# Patient Record
Sex: MALE | Race: WHITE | NOT HISPANIC OR LATINO | Employment: FULL TIME | URBAN - METROPOLITAN AREA
[De-identification: names, ages, dates, MRNs, and addresses within clinical notes are randomized per-mention and may not be internally consistent; named-entity substitution may affect disease eponyms.]

---

## 2019-02-18 ENCOUNTER — OFFICE VISIT (OUTPATIENT)
Dept: FAMILY MEDICINE | Facility: CLINIC | Age: 44
End: 2019-02-18
Payer: COMMERCIAL

## 2019-02-18 VITALS
SYSTOLIC BLOOD PRESSURE: 138 MMHG | OXYGEN SATURATION: 97 % | BODY MASS INDEX: 28.84 KG/M2 | HEIGHT: 71 IN | HEART RATE: 111 BPM | WEIGHT: 206 LBS | TEMPERATURE: 97.1 F | DIASTOLIC BLOOD PRESSURE: 94 MMHG

## 2019-02-18 VITALS — DIASTOLIC BLOOD PRESSURE: 98 MMHG | SYSTOLIC BLOOD PRESSURE: 144 MMHG | HEART RATE: 86 BPM | OXYGEN SATURATION: 97 %

## 2019-02-18 DIAGNOSIS — L71.8 ACNE ROSACEA, PAPULAR TYPE: Primary | ICD-10-CM

## 2019-02-18 DIAGNOSIS — L30.9 ECZEMA, UNSPECIFIED TYPE: ICD-10-CM

## 2019-02-18 DIAGNOSIS — L53.9 FACIAL ERYTHEMA: ICD-10-CM

## 2019-02-18 DIAGNOSIS — I10 BENIGN ESSENTIAL HYPERTENSION: Primary | ICD-10-CM

## 2019-02-18 DIAGNOSIS — I78.1 FACIAL TELANGIECTASIA: ICD-10-CM

## 2019-02-18 LAB
ANION GAP SERPL CALCULATED.3IONS-SCNC: 7 MMOL/L (ref 3–14)
BUN SERPL-MCNC: 12 MG/DL (ref 7–30)
CALCIUM SERPL-MCNC: 9 MG/DL (ref 8.5–10.1)
CHLORIDE SERPL-SCNC: 101 MMOL/L (ref 94–109)
CO2 SERPL-SCNC: 29 MMOL/L (ref 20–32)
CREAT SERPL-MCNC: 0.75 MG/DL (ref 0.66–1.25)
GFR SERPL CREATININE-BSD FRML MDRD: >90 ML/MIN/{1.73_M2}
GLUCOSE SERPL-MCNC: 131 MG/DL (ref 70–99)
POTASSIUM SERPL-SCNC: 3.5 MMOL/L (ref 3.4–5.3)
SODIUM SERPL-SCNC: 137 MMOL/L (ref 133–144)

## 2019-02-18 PROCEDURE — 36415 COLL VENOUS BLD VENIPUNCTURE: CPT | Performed by: PHYSICIAN ASSISTANT

## 2019-02-18 PROCEDURE — 99203 OFFICE O/P NEW LOW 30 MIN: CPT | Performed by: PHYSICIAN ASSISTANT

## 2019-02-18 PROCEDURE — 99214 OFFICE O/P EST MOD 30 MIN: CPT | Performed by: FAMILY MEDICINE

## 2019-02-18 PROCEDURE — 80048 BASIC METABOLIC PNL TOTAL CA: CPT | Performed by: PHYSICIAN ASSISTANT

## 2019-02-18 RX ORDER — AZELAIC ACID 0.15 G/G
GEL TOPICAL
Qty: 50 G | Refills: 3 | Status: SHIPPED | OUTPATIENT
Start: 2019-02-18 | End: 2020-03-10

## 2019-02-18 RX ORDER — TRIAMCINOLONE ACETONIDE 1 MG/G
CREAM TOPICAL
Qty: 80 G | Refills: 0 | Status: SHIPPED | OUTPATIENT
Start: 2019-02-18 | End: 2019-07-23

## 2019-02-18 RX ORDER — METRONIDAZOLE 7.5 MG/G
GEL TOPICAL
COMMUNITY
Start: 2018-08-22 | End: 2019-02-18 | Stop reason: ALTCHOICE

## 2019-02-18 RX ORDER — MULTIPLE VITAMINS W/ MINERALS TAB 9MG-400MCG
1 TAB ORAL DAILY
COMMUNITY

## 2019-02-18 RX ORDER — HYDROCHLOROTHIAZIDE 25 MG/1
TABLET ORAL
Qty: 90 TABLET | Refills: 1 | Status: SHIPPED | OUTPATIENT
Start: 2019-02-18 | End: 2019-03-22 | Stop reason: ALTCHOICE

## 2019-02-18 RX ORDER — AMLODIPINE BESYLATE 5 MG/1
TABLET ORAL
Qty: 90 TABLET | Refills: 1 | Status: SHIPPED | OUTPATIENT
Start: 2019-02-18 | End: 2019-03-22 | Stop reason: ALTCHOICE

## 2019-02-18 RX ORDER — HYDROCHLOROTHIAZIDE 25 MG/1
TABLET ORAL
COMMUNITY
Start: 2018-04-11 | End: 2019-02-18

## 2019-02-18 RX ORDER — DOXYCYCLINE 100 MG/1
100 CAPSULE ORAL 2 TIMES DAILY
Qty: 120 CAPSULE | Refills: 0 | Status: SHIPPED | OUTPATIENT
Start: 2019-02-18 | End: 2019-04-22

## 2019-02-18 RX ORDER — AMLODIPINE BESYLATE 5 MG/1
TABLET ORAL
COMMUNITY
Start: 2018-03-15 | End: 2019-02-18

## 2019-02-18 RX ORDER — TRIAMCINOLONE ACETONIDE 1 MG/G
OINTMENT TOPICAL
COMMUNITY
Start: 2018-02-14 | End: 2019-02-18 | Stop reason: ALTCHOICE

## 2019-02-18 RX ORDER — AMPICILLIN TRIHYDRATE 500 MG
CAPSULE ORAL
COMMUNITY
Start: 2012-12-06 | End: 2020-12-03

## 2019-02-18 ASSESSMENT — MIFFLIN-ST. JEOR: SCORE: 1841.54

## 2019-02-18 NOTE — LETTER
February 19, 2019      Bacilio Stackr  [address]  [place] [address]        Dear ,    We are writing to inform you of your test results.      -Kidney function is normal (Cr, GFR), Sodium is normal, Potassium is normal, Calcium is normal, You were not fasting for this sample and so your blood sugar looks high, but this is a normal blood sugar after eating      Resulted Orders   Basic metabolic panel   Result Value Ref Range    Sodium 137 133 - 144 mmol/L    Potassium 3.5 3.4 - 5.3 mmol/L    Chloride 101 94 - 109 mmol/L    Carbon Dioxide 29 20 - 32 mmol/L    Anion Gap 7 3 - 14 mmol/L    Glucose 131 (H) 70 - 99 mg/dL      Comment:      Non Fasting    Urea Nitrogen 12 7 - 30 mg/dL    Creatinine 0.75 0.66 - 1.25 mg/dL    GFR Estimate >90 >60 mL/min/[1.73_m2]      Comment:      Non  GFR Calc  Starting 12/18/2018, serum creatinine based estimated GFR (eGFR) will be   calculated using the Chronic Kidney Disease Epidemiology Collaboration   (CKD-EPI) equation.      GFR Estimate If Black >90 >60 mL/min/[1.73_m2]      Comment:       GFR Calc  Starting 12/18/2018, serum creatinine based estimated GFR (eGFR) will be   calculated using the Chronic Kidney Disease Epidemiology Collaboration   (CKD-EPI) equation.      Calcium 9.0 8.5 - 10.1 mg/dL       If you have any questions or concerns, please call the clinic at the number listed above.       Sincerely,          Ari Danielson PA-C

## 2019-02-18 NOTE — LETTER
"    2/18/2019         RE: Bacilio Wolf  [address]  [place] [address]        Dear Colleague,    Thank you for referring your patient, Bacilio Wolf, to the [facility] [place]. Please see a copy of my visit note below.    [facility] - PRIMARY CARE SKIN    CC: facial redness  SUBJECTIVE:   Bacilio Wolf is a(n) 43 year old male who presents to clinic today because of suspected rosacea that started 2 years ago, although symptoms may have been chronic since younger.    This was diagnosed as ?seborrheic dermatitis treated with an oral medication to be taken when needed. Symptoms may have resolved while taking the oral medication. Another clinician then described it as rosacea and treated with metronidazole 0.75% gel, but it appeared to be effective when used for 6-8 months.    Facial symptoms include: Facial redness, welts on the face, in the ears, and behind the ears, crusting areas on the ears and face that burst spontaneously. Facial redness waxes and wanes.  Eye irritation: YES - \"a little bit, sometimes\"    He also reports a chronic rash on the right lateral lower leg beginning 2 years ago as well. He has used triamcinolone 0.1% cream. Symptoms on the legs resolve when triamcinolone 0.1% cream is used for 2-3 days at a time, resolving for 1 week.    He has a cyst with white purulent drainage.    Aggravating factors :     Sun: ?YES.    Alcohol: YES.    Specific foods: breaded/fried foods, spicy foods.  Discontinuation of these products has not provided relief.  Relieving factors : none identified    Products used: He has been using Vanicream facial moisturizer.    Personal Medical History  Skin cancer: NO  Eczema Psoriasis Autoimmune   NO ?YES NO   Other: ?rosacea    Family Medical History  Rosacea: NO.  Skin cancer: NO  Eczema Psoriasis Autoimmune   NO NO NO     Occupation: office, [employer] (indoor).    Social History     Tobacco Use     Smoking status: Never Smoker     " Smokeless tobacco: Never Used   Substance Use Topics     Alcohol use: Yes     Drug use: No     Issue Two: He has not been taking antihypertensive medications.    Refer to electronic medical record (EMR) for past medical history and medications.    INTEGUMENTARY/SKIN: POSITIVE for non-healing lesions and rash  ROS: 14 point review of systems was negative except the symptoms listed above in the HPI.    This document serves as a record of the services and decisions personally performed and made by Kathleen Cardona MD and was created by Jered Vera, a trained medical scribe, based on personal observations and provider statements to the medical scribe.  February 18, 2019 8:36 AM   Jered Vera    OBJECTIVE:   GENERAL: healthy, alert and in mild distress.  SKIN: Grace Skin Type - I.  Face, Neck, Trunk, Arms and Legs examined. The dermatoscope was used to help evaluate pigmented lesions.  Skin Pertinent Findings:  Right lateral lower leg: erythematous lightly scaly patch    Ears, arms, trunk: Clear    Face: diffuse erythema, multiple scattered papules. A few pustules. Scattered telangiectasias on the cheek. Some light scaling on the cheeks    A few papules in the postauricular areas.            Diagnostic Test Results:  none     ASSESSMENT:     Encounter Diagnoses   Name Primary?     Acne rosacea, papular type Yes     Facial erythema      Facial telangiectasia      Eczema, unspecified type      MDM:   Risk, benefits and alternative treatments were discussed with the patient. Cleansing program was discussed with the patient. Gentle cleansers were recommended. I discussed the importance of treating the skin gently and discussed triggers that could cause a flare. I discussed that this is usually a chronic condition that can be controlled, but not cured.    PLAN:   Patient Instructions   FUTURE APPOINTMENTS  Follow up in 2 month(s).    TOPICAL MEDICATION INSTRUCTIONS  Azelaic acid (Finacea) 15% gel.    Apply a layer to  the affected area(s) on face two times per day, everyday regardless if symptoms are present or not.    Keep in mind to also regularly use moisturizer, as this preventative measure can help maintain your skin's natural moisture barrier.    Apply moisturizer after application of medication.    ORAL ANTIBIOTIC  Take by mouth doxycycline 100 mg two time(s) a day for 2 months.    TETRACYCLINE ANTIBIOTIC INFORMATION  Minocycline and doxycycline are tetracycline antibiotics and can increase your sun sensitivity, so make sure to be vigilant in regularly applying sunscreen. Also, avoid taking these with dairy products, as calcium can bind the antibiotic, making it unavailable to your body.    Avoid excessive alcohol, spicy food consumption or sun exposure.      TOPICAL STEROID INSTRUCTIONS - for legs  Triamcinolone 0.1% cream.  Apply two times per day for 10-14 days. Then, use only when needed.  1. Wash hands before applying topical steroid.  2. Apply sparingly (just enough to rub in) onto affected areas of the legs.  (Use the adult fingertip unit (FTU) as a guide.) Apply no more than 0.5 FTU per area.      This higher strength steroid should never be used on face nor groin.    After the initial treatment, topical steroid may be used as needed for flare-ups but only for short-term treatment. If you are using this for prolonged periods of time to control flare-ups, return to clinic for re-evaluation of treatment.    Keep in mind to also regularly use moisturizer, as this preventative measure can help maintain your skin's natural protective moisture barrier.    DRY SKIN MANAGEMENT INSTRUCTIONS  Routine use of moisturizer is important for healthy, resilient skin not just for soft skin.     Sealing in moisture    Twice daily use of a moisturizer such as over-the-counter (OTC) CeraVe moisturizer cream (in the jar). CeraVe products contain ceramides and filaggrin proteins that can help to maintain the body's moisture  "layer.    Also use your Vanicream facial moisturizer regularly.    After cleansing or washing, always apply moisturizer immediately after drying off (pat dry only) for best effect.    Protection while hydrating    Do not overuse soap. Unless you have been sweating extensively, just apply soap to groin and armpits.    Recommended products for body include: OTC unscented Dove for sensitive skin or OTC Vanicream cleansing bar.    Recommended facial cleansers include: OTC CeraVe hydrating facial cleanser or OTC Cetaphil daily facial cleanser.    Avoid use of    Scented/perfumed products    Irritating clothing (wool, new jeans, new/unwashed clothing, scratchy synthetics)    Neosporin or triple antibiotic topical products    Products containing aloe, herbs, Vitamin E, or other \"natural ingredients\".    Dryer sheets or fabric softeners (while symptoms are present)    If a topical medication is prescribed, apply topical prescription first, followed by use of moisturizing product.      BP Readings from Last 3 Encounters:   02/18/19 (!) 144/98     Bacilio Wolf was evaluated in a specialty clinic today at which time his blood pressure was noted to be elevated. He has been advised to follow up with his primary provider or with a nurse only visit. We are also routing this message to his primary provider as an FYI.    [place] to follow up with Primary Care provider regarding elevated blood pressure.      TT: 25 minutes.  CT: 20 minutes.    The information in this document, created by the medical scribe for me, accurately reflects the services I personally performed and the decisions made by me. I have reviewed and approved this document for accuracy prior to leaving the patient care area.  February 18, 2019 8:36 AM  Kathleen Cardona MD  [facility] [place]      Again, thank you for allowing me to participate in the care of your patient.        Sincerely,        Kathleen Cardona MD    "

## 2019-02-18 NOTE — PROGRESS NOTES
SUBJECTIVE:   Bacilio Wolf is a 43 year old male who presents to clinic today for the following health issues:      Hypertension Follow-up      Outpatient blood pressures are not being checked.    Low Salt Diet: not monitoring salt      Amount of exercise or physical activity: None    Problems taking medications regularly: No    Medication side effects: none    Diet: diet specific to his rosasea     Bacilio recently switched health insurance and presents to the clinic today for BP check and to establish care. He has been out of his hydrochlorothiazide 25 mg and amlodipine 5 mg for the past 3 months, has not been checking his BP at home.         Problem list and histories reviewed & adjusted, as indicated.  Additional history: as documented    There is no problem list on file for this patient.    No past surgical history on file.    Social History     Tobacco Use     Smoking status: Never Smoker     Smokeless tobacco: Never Used   Substance Use Topics     Alcohol use: Yes     No family history on file.      Current Outpatient Medications   Medication Sig Dispense Refill     amLODIPine (NORVASC) 5 MG tablet TAKE 1 TABLET BY MOUTH DAILY (EVERY 24 HOURS). 90 tablet 1     azelaic acid (FINACIA) 15 % external gel APPLY TO FACE BID 50 g 3     hydrochlorothiazide (HYDRODIURIL) 25 MG tablet TAKE 1 TAB BY MOUTH DAILY. 90 tablet 1     metroNIDAZOLE (METROCREAM) 0.75 % external cream Apply topically 2 times daily       multivitamin w/minerals (MULTI-VITAMIN) tablet Take 1 tablet by mouth daily       triamcinolone (KENALOG) 0.1 % external cream Apply to AA on legs bid when needed 80 g 0     Cholecalciferol (D 1000) 1000 units CAPS        doxycycline monohydrate (MONODOX) 100 MG capsule Take 1 capsule (100 mg) by mouth 2 times daily (Patient not taking: Reported on 2/18/2019) 120 capsule 0     Allergies   Allergen Reactions     Diphenhydramine Rash     Rash , confusion       Reviewed and updated as needed this visit by  "clinical staff       Reviewed and updated as needed this visit by Provider         ROS:  Constitutional, HEENT, cardiovascular, pulmonary, gi and gu systems are negative, except as otherwise noted.    OBJECTIVE:     BP (!) 138/94   Pulse 111   Temp 97.1  F (36.2  C) (Tympanic)   Ht 1.803 m (5' 11\")   Wt 93.4 kg (206 lb)   SpO2 97%   BMI 28.73 kg/m    Body mass index is 28.73 kg/m .  GENERAL: healthy, alert and no distress  RESP: lungs clear to auscultation - no rales, rhonchi or wheezes  CV: regular rate and rhythm, normal S1 S2    Diagnostic Test Results:  none     ASSESSMENT/PLAN:       1. Benign essential hypertension  Elevated today, will restart old regimen.  He will RTC in 2 weeks for recheck, 6 months for CPE  - Basic metabolic panel  - hydrochlorothiazide (HYDRODIURIL) 25 MG tablet; TAKE 1 TAB BY MOUTH DAILY.  Dispense: 90 tablet; Refill: 1  - amLODIPine (NORVASC) 5 MG tablet; TAKE 1 TABLET BY MOUTH DAILY (EVERY 24 HOURS).  Dispense: 90 tablet; Refill: 1    Follow-Up: As above    Ari Danielson PA-C  [facility] [place]  "

## 2019-02-18 NOTE — PROGRESS NOTES
"[facility] - PRIMARY CARE SKIN    CC: facial redness  SUBJECTIVE:   Bacilio Wolf is a(n) 43 year old male who presents to clinic today because of suspected rosacea that started 2 years ago, although symptoms may have been chronic since younger.    This was diagnosed as ?seborrheic dermatitis treated with an oral medication to be taken when needed. Symptoms may have resolved while taking the oral medication. Another clinician then described it as rosacea and treated with metronidazole 0.75% gel, but it appeared to be effective when used for 6-8 months.    Facial symptoms include: Facial redness, welts on the face, in the ears, and behind the ears, crusting areas on the ears and face that burst spontaneously. Facial redness waxes and wanes.  Eye irritation: YES - \"a little bit, sometimes\"    He also reports a chronic rash on the right lateral lower leg beginning 2 years ago as well. He has used triamcinolone 0.1% cream. Symptoms on the legs resolve when triamcinolone 0.1% cream is used for 2-3 days at a time, resolving for 1 week.    He has a cyst with white purulent drainage.    Aggravating factors :     Sun: ?YES.    Alcohol: YES.    Specific foods: breaded/fried foods, spicy foods.  Discontinuation of these products has not provided relief.  Relieving factors : none identified    Products used: He has been using Vanicream facial moisturizer.    Personal Medical History  Skin cancer: NO  Eczema Psoriasis Autoimmune   NO ?YES NO   Other: ?rosacea    Family Medical History  Rosacea: NO.  Skin cancer: NO  Eczema Psoriasis Autoimmune   NO NO NO     Occupation: office, [employer] (indoor).    Social History     Tobacco Use     Smoking status: Never Smoker     Smokeless tobacco: Never Used   Substance Use Topics     Alcohol use: Yes     Drug use: No     Issue Two: He has not been taking antihypertensive medications.    Refer to electronic medical record (EMR) for past medical history and " medications.    INTEGUMENTARY/SKIN: POSITIVE for non-healing lesions and rash  ROS: 14 point review of systems was negative except the symptoms listed above in the HPI.    This document serves as a record of the services and decisions personally performed and made by Kathleen Cardona MD and was created by Jered Vera, a trained medical scribe, based on personal observations and provider statements to the medical scribe.  February 18, 2019 8:36 AM   Jered Vera    OBJECTIVE:   GENERAL: healthy, alert and in mild distress.  SKIN: Grace Skin Type - I.  Face, Neck, Trunk, Arms and Legs examined. The dermatoscope was used to help evaluate pigmented lesions.  Skin Pertinent Findings:  Right lateral lower leg: erythematous lightly scaly patch    Ears, arms, trunk: Clear    Face: diffuse erythema, multiple scattered papules. A few pustules. Scattered telangiectasias on the cheek. Some light scaling on the cheeks    A few papules in the postauricular areas.            Diagnostic Test Results:  none     ASSESSMENT:     Encounter Diagnoses   Name Primary?     Acne rosacea, papular type Yes     Facial erythema      Facial telangiectasia      Eczema, unspecified type      MDM:   Risk, benefits and alternative treatments were discussed with the patient. Cleansing program was discussed with the patient. Gentle cleansers were recommended. I discussed the importance of treating the skin gently and discussed triggers that could cause a flare. I discussed that this is usually a chronic condition that can be controlled, but not cured.    PLAN:   Patient Instructions   FUTURE APPOINTMENTS  Follow up in 2 month(s).    TOPICAL MEDICATION INSTRUCTIONS  Azelaic acid (Finacea) 15% gel.    Apply a layer to the affected area(s) on face two times per day, everyday regardless if symptoms are present or not.    Keep in mind to also regularly use moisturizer, as this preventative measure can help maintain your skin's natural moisture  barrier.    Apply moisturizer after application of medication.    ORAL ANTIBIOTIC  Take by mouth doxycycline 100 mg two time(s) a day for 2 months.    TETRACYCLINE ANTIBIOTIC INFORMATION  Minocycline and doxycycline are tetracycline antibiotics and can increase your sun sensitivity, so make sure to be vigilant in regularly applying sunscreen. Also, avoid taking these with dairy products, as calcium can bind the antibiotic, making it unavailable to your body.    Avoid excessive alcohol, spicy food consumption or sun exposure.      TOPICAL STEROID INSTRUCTIONS - for legs  Triamcinolone 0.1% cream.  Apply two times per day for 10-14 days. Then, use only when needed.  1. Wash hands before applying topical steroid.  2. Apply sparingly (just enough to rub in) onto affected areas of the legs.  (Use the adult fingertip unit (FTU) as a guide.) Apply no more than 0.5 FTU per area.      This higher strength steroid should never be used on face nor groin.    After the initial treatment, topical steroid may be used as needed for flare-ups but only for short-term treatment. If you are using this for prolonged periods of time to control flare-ups, return to clinic for re-evaluation of treatment.    Keep in mind to also regularly use moisturizer, as this preventative measure can help maintain your skin's natural protective moisture barrier.    DRY SKIN MANAGEMENT INSTRUCTIONS  Routine use of moisturizer is important for healthy, resilient skin not just for soft skin.     Sealing in moisture    Twice daily use of a moisturizer such as over-the-counter (OTC) CeraVe moisturizer cream (in the jar). CeraVe products contain ceramides and filaggrin proteins that can help to maintain the body's moisture layer.    Also use your Vanicream facial moisturizer regularly.    After cleansing or washing, always apply moisturizer immediately after drying off (pat dry only) for best effect.    Protection while hydrating    Do not overuse soap.  "Unless you have been sweating extensively, just apply soap to groin and armpits.    Recommended products for body include: OTC unscented Dove for sensitive skin or OTC Vanicream cleansing bar.    Recommended facial cleansers include: OTC CeraVe hydrating facial cleanser or OTC Cetaphil daily facial cleanser.    Avoid use of    Scented/perfumed products    Irritating clothing (wool, new jeans, new/unwashed clothing, scratchy synthetics)    Neosporin or triple antibiotic topical products    Products containing aloe, herbs, Vitamin E, or other \"natural ingredients\".    Dryer sheets or fabric softeners (while symptoms are present)    If a topical medication is prescribed, apply topical prescription first, followed by use of moisturizing product.      BP Readings from Last 3 Encounters:   02/18/19 (!) 144/98     Bacilio Wolf was evaluated in a specialty clinic today at which time his blood pressure was noted to be elevated. He has been advised to follow up with his primary provider or with a nurse only visit. We are also routing this message to his primary provider as an FYI.    [place] to follow up with Primary Care provider regarding elevated blood pressure.      TT: 25 minutes.  CT: 20 minutes.    The information in this document, created by the medical scribe for me, accurately reflects the services I personally performed and the decisions made by me. I have reviewed and approved this document for accuracy prior to leaving the patient care area.  February 18, 2019 8:36 AM  Kathleen Cardona MD  [facility] [place]    "

## 2019-03-04 ENCOUNTER — ALLIED HEALTH/NURSE VISIT (OUTPATIENT)
Dept: NURSING | Facility: CLINIC | Age: 44
End: 2019-03-04
Payer: COMMERCIAL

## 2019-03-04 VITALS — SYSTOLIC BLOOD PRESSURE: 136 MMHG | DIASTOLIC BLOOD PRESSURE: 98 MMHG

## 2019-03-04 DIAGNOSIS — Z01.30 BLOOD PRESSURE CHECK: Primary | ICD-10-CM

## 2019-03-04 PROCEDURE — 99207 ZZC NO CHARGE NURSE ONLY: CPT

## 2019-03-04 NOTE — PROGRESS NOTES
Bacilio Wolf is a 43 year old year old patient who comes in today for a Blood Pressure check because of new medication and ongoing blood pressure monitoring.  Vital Signs as repeated by RN  Patient is taking medication as prescribed  Patient is tolerating medications well.  Patient is not monitoring Blood Pressure at home.    Current complaints: cough  Disposition:  huddled with provider and patient instructed to give us a call if he has any chest pain

## 2019-03-22 ENCOUNTER — OFFICE VISIT (OUTPATIENT)
Dept: FAMILY MEDICINE | Facility: CLINIC | Age: 44
End: 2019-03-22
Payer: COMMERCIAL

## 2019-03-22 VITALS
DIASTOLIC BLOOD PRESSURE: 89 MMHG | BODY MASS INDEX: 29.99 KG/M2 | HEIGHT: 71 IN | WEIGHT: 214.2 LBS | HEART RATE: 97 BPM | SYSTOLIC BLOOD PRESSURE: 136 MMHG | OXYGEN SATURATION: 99 % | TEMPERATURE: 98.2 F

## 2019-03-22 DIAGNOSIS — I87.2 STASIS DERMATITIS OF BOTH LEGS: Primary | ICD-10-CM

## 2019-03-22 DIAGNOSIS — I10 BENIGN ESSENTIAL HYPERTENSION: ICD-10-CM

## 2019-03-22 DIAGNOSIS — R60.0 BILATERAL LOWER EXTREMITY EDEMA: ICD-10-CM

## 2019-03-22 LAB
ALBUMIN SERPL-MCNC: 4.1 G/DL (ref 3.4–5)
ALP SERPL-CCNC: 94 U/L (ref 40–150)
ALT SERPL W P-5'-P-CCNC: 52 U/L (ref 0–70)
ANION GAP SERPL CALCULATED.3IONS-SCNC: 9 MMOL/L (ref 3–14)
AST SERPL W P-5'-P-CCNC: 43 U/L (ref 0–45)
BILIRUB SERPL-MCNC: 0.3 MG/DL (ref 0.2–1.3)
BUN SERPL-MCNC: 14 MG/DL (ref 7–30)
CALCIUM SERPL-MCNC: 8.7 MG/DL (ref 8.5–10.1)
CHLORIDE SERPL-SCNC: 103 MMOL/L (ref 94–109)
CO2 SERPL-SCNC: 27 MMOL/L (ref 20–32)
CREAT SERPL-MCNC: 0.68 MG/DL (ref 0.66–1.25)
GFR SERPL CREATININE-BSD FRML MDRD: >90 ML/MIN/{1.73_M2}
GLUCOSE SERPL-MCNC: 88 MG/DL (ref 70–99)
POTASSIUM SERPL-SCNC: 3.6 MMOL/L (ref 3.4–5.3)
PROT SERPL-MCNC: 7.5 G/DL (ref 6.8–8.8)
SODIUM SERPL-SCNC: 139 MMOL/L (ref 133–144)

## 2019-03-22 PROCEDURE — 99214 OFFICE O/P EST MOD 30 MIN: CPT | Performed by: NURSE PRACTITIONER

## 2019-03-22 PROCEDURE — 80053 COMPREHEN METABOLIC PANEL: CPT | Performed by: NURSE PRACTITIONER

## 2019-03-22 PROCEDURE — 36415 COLL VENOUS BLD VENIPUNCTURE: CPT | Performed by: NURSE PRACTITIONER

## 2019-03-22 RX ORDER — LISINOPRIL AND HYDROCHLOROTHIAZIDE 20; 25 MG/1; MG/1
1 TABLET ORAL DAILY
Qty: 90 TABLET | Refills: 0 | Status: SHIPPED | OUTPATIENT
Start: 2019-03-22 | End: 2019-07-02

## 2019-03-22 ASSESSMENT — MIFFLIN-ST. JEOR: SCORE: 1878.73

## 2019-03-22 NOTE — PROGRESS NOTES
SUBJECTIVE:   Bacilio Wolf is a 43 year old male who presents to clinic today for the following health issues:      Joint Pain    Onset: 2 weeks     Description:   Location: right ankle  Character: Dull ache mostly swelling     Intensity: moderate    Progression of Symptoms: intermittent    Accompanying Signs & Symptoms:  Other symptoms: none    History:   Previous similar pain: no       Precipitating factors:   Trauma or overuse: YES- pt moved 2 weeks ago     Alleviating factors:  Improved by: rest/inactivity    Therapies Tried and outcome: none    HPI: Bacilio presents today with the complaint of progressively-worsening bilateral (R > L) lower extremity swelling, redness, and pain. He states that this issue started about 2 weeks ago and has gotten worse. Denies history of heart disease, as well as any classic heart failure symptoms. Also denies history of renal or hepatic disease. He does endorse daily alcohol consumption (at least a few drinks per day), as well as high-dose naproxen therapy (up to 6 tabs per day). He denies recalled injury or trauma to the affected areas. He states that the swelling worsens throughout the day and is relieved with elevation of his legs. He has hypertension, for which he takes amlodipine and hydrochlorothiazide daily. Denies paroxysmal nocturnal dyspnea, nocturia, new cough, shortness of breath, activity intolerance. Denies changes in urinary or bowel patterns, as well as abdominal pain. Denies calf tenderness, chest pain, shortness of breath, history of blood clots, recent long drives / flights, fever, chills, change in weight. He denies prior history of lower extremity swelling. Non-smoker.       Problem list and histories reviewed & adjusted, as indicated.  Additional history: as documented    There is no problem list on file for this patient.    History reviewed. No pertinent surgical history.    Social History     Tobacco Use     Smoking status: Never Smoker     Smokeless  "tobacco: Never Used   Substance Use Topics     Alcohol use: Yes     History reviewed. No pertinent family history.        Reviewed and updated as needed this visit by clinical staff  Tobacco  Allergies  Meds  Problems  Med Hx  Surg Hx  Fam Hx  Soc Hx        Reviewed and updated as needed this visit by Provider  Tobacco  Allergies  Meds  Problems  Med Hx  Surg Hx  Fam Hx         ROS:  Constitutional, cardiovascular, pulmonary, GI, , musculoskeletal, neuro, skin systems are negative, except as otherwise noted.    OBJECTIVE:     /89   Pulse 97   Temp 98.2  F (36.8  C) (Tympanic)   Ht 1.803 m (5' 11\")   Wt 97.2 kg (214 lb 3.2 oz)   SpO2 99%   BMI 29.87 kg/m    Body mass index is 29.87 kg/m .  GENERAL: healthy, alert and no distress  EYES: Eyes grossly normal to inspection, PERRL and conjunctivae and sclerae normal  HENT: ear canals and TM's normal, nose and mouth without ulcers or lesions  NECK: no adenopathy, no asymmetry, masses, or scars and thyroid normal to palpation  RESP: lungs clear to auscultation - no rales, rhonchi or wheezes  CV: regular rate and rhythm, normal S1 S2, no S3 or S4, no murmur, click or rub, no peripheral edema and peripheral pulses strong  ABDOMEN: soft, nontender, no hepatosplenomegaly, no masses and bowel sounds normal  MS: Bilateral lower extremity pitting edema (R 3+ > L +2) with overlying tautness and redness. Mildly tender. ROM intact in all involved joints without pain or limitation.   SKIN: Likely stasis dermatitis (erythematous, swelling) starting at mid-tibia. No bleeding, discharge, or skin integrity compromise.  NEURO: Normal strength and tone, mentation intact and speech normal    Diagnostic Test Results:  No results found for this or any previous visit (from the past 24 hour(s)).    ASSESSMENT/PLAN:     Bacilio was seen today for musculoskeletal problem. Unlikely that this represents DVT (bilateral involvement, improvement with elevation, few risk " factors). Not clear what the origin of his edema is. Denies HF, kidney disease, and hepatic disease symptoms, although he does have HTN, uses high doses of NSAIDs daily, and drinks alcohol daily. I think that checking CMP would be wise at this point to look for low albumin, elevated LFTs, electrolyte imbalance, and renal injury. Will also switch his anti-hypertensive to lisinopril plus hydrochlorothiazide given that lower extremity edema is a known side effect of amlodipine (though strange that it would develop so suddenly). Will order compression stockings today, as well, and stress the importance of caring for his skin in the affected regions (to prevent stasis ulcers from developing). Also recommended elevation of legs when able. Will consider referral to vascular if no clear etiology is ascertained and/or if he doesn't note improvement with NSAID reduction and switch from CCB. Will be in touch with lab results. He agrees to keep me posted. Discussed reasons to call or return to clinic. Bacilio acknowledges and demonstrates understanding of circumstances under which care should be sought urgently or emergently. Follow up as discussed. Discussed risks, benefits, alternatives, potential side effects, and proper administration of new medication / treatment. Agrees with plan of care. All questions answered.     Diagnoses and all orders for this visit:    Stasis dermatitis of both legs  -     order for DME; Equipment being ordered: Compression stockings for venous stasis / LLE edema    Bilateral lower extremity edema  -     Comprehensive metabolic panel (BMP + Alb, Alk Phos, ALT, AST, Total. Bili, TP)  -     order for DME; Equipment being ordered: Compression stockings for venous stasis / LLE edema    Benign essential hypertension  -     lisinopril-hydrochlorothiazide (PRINZIDE/ZESTORETIC) 20-25 MG tablet; Take 1 tablet by mouth daily      See Patient Instructions    Danilo Reinoso, MALINDA  [facility]  [facility]

## 2019-03-22 NOTE — PATIENT INSTRUCTIONS
Stop amlodipine and hydrodiuril  Start lisinopril-hydrochlorothiazide  Get fitted for compression stockings  Elevate legs when able  Keep skin moisturized  I will call you with lab results

## 2019-04-22 ENCOUNTER — OFFICE VISIT (OUTPATIENT)
Dept: FAMILY MEDICINE | Facility: CLINIC | Age: 44
End: 2019-04-22
Payer: COMMERCIAL

## 2019-04-22 VITALS — DIASTOLIC BLOOD PRESSURE: 80 MMHG | SYSTOLIC BLOOD PRESSURE: 134 MMHG

## 2019-04-22 DIAGNOSIS — L71.9 ROSACEA: Primary | ICD-10-CM

## 2019-04-22 PROCEDURE — 99213 OFFICE O/P EST LOW 20 MIN: CPT | Performed by: FAMILY MEDICINE

## 2019-04-22 RX ORDER — DOXYCYCLINE 50 MG/1
TABLET ORAL
Qty: 240 TABLET | Refills: 0 | Status: SHIPPED | OUTPATIENT
Start: 2019-04-22 | End: 2019-09-17

## 2019-04-22 NOTE — PROGRESS NOTES
"[facility] - PRIMARY CARE SKIN    CC: rosacea  SUBJECTIVE:   Bacilio Wolf is a(n) 43 year old male who presents to clinic today for follow-up of rosacea that started 2 years ago, although symptoms may have been chronic since younger.    Current treatment:   azelaic acid 15% gel BID  Doxycycline 100 mg BID.  He is trying to apply sunscreen four times a day.  Response to treatment: Symptoms on the face are greatly improved.  Side effects of treatment noted: None.    Previous therapies: metronidazole 0.75% gel    Facial symptoms include: Facial redness, welts on the face, in the ears, and behind the ears, crusting areas on the ears and face that burst spontaneously. Facial redness waxes and wanes.  Eye irritation: YES - \"a little bit, sometimes\"    Aggravating factors :     Sun: ?YES.     Alcohol: YES.    Specific foods: breaded/fried foods, spicy foods.  Discontinuation of these products has not provided relief.  Relieving factors : none identified    Products used: He has been using Vanicream facial moisturizer.    Eczema  He is continuing to use triamcinolone 0.1% cream as needed for control of symptoms. He has had a reaction of swelling to blood pressure medications.    Personal Medical History  Skin cancer: NO  Eczema Psoriasis Autoimmune   NO ?YES NO   Other: ?rosacea    Family Medical History  Rosacea: NO.  Skin cancer: NO  Eczema Psoriasis Autoimmune   NO NO NO     Occupation: office, [employer] (indoor).    Social History     Tobacco Use     Smoking status: Never Smoker     Smokeless tobacco: Never Used   Substance Use Topics     Alcohol use: Yes     Drug use: No     Refer to electronic medical record (EMR) for past medical history and medications.    INTEGUMENTARY/SKIN: POSITIVE for non-healing lesions and rash  ROS: 14 point review of systems was negative except the symptoms listed above in the HPI.    This document serves as a record of the services and decisions personally performed and made by " Kathleen Carodna MD and was created by Jered Vera, a trained medical scribe, based on personal observations and provider statements to the medical scribe.  April 22, 2019 8:40 AM   Jered Vera    OBJECTIVE:   GENERAL: healthy, alert and in mild distress.  SKIN: Grace Skin Type - I.  Face examined. The dermatoscope was used to help evaluate pigmented lesions.  Skin Pertinent Findings:  Face: overall improved but still significant baseline erythema on the nose and mid-portion of cheeks. Telangiectasias. A few papules on the nose.    ASSESSMENT:     Encounter Diagnosis   Name Primary?     Rosacea Yes     MDM:   Risk, benefits and alternative treatments were discussed with the patient. Cleansing program was discussed with the patient. Gentle cleansers were recommended. I discussed the importance of treating the skin gently and discussed triggers that could cause a flare. I discussed that this is usually a chronic condition that can be controlled, but not cured.    PLAN:   Patient Instructions   FUTURE APPOINTMENTS  Follow up in 3 month(s).    TOPICAL MEDICATION INSTRUCTIONS  Azelaic acid 15% gel    Apply a layer to the affected area(s) on face two times per day, everyday regardless if symptoms are present or not.    Keep in mind to also regularly use moisturizer, as this preventative measure can help maintain your skin's natural moisture barrier.    Apply moisturizer after application of medication.    ORAL ANTIBIOTIC  Take by mouth doxycycline 100 mg two time(s) a day for 1 more month  After 1 month, take by mouth doxycycline 50 mg twice a day.    TETRACYCLINE ANTIBIOTIC INFORMATION  Minocycline and doxycycline are tetracycline antibiotics and can increase your sun sensitivity, so make sure to be vigilant in regularly applying sunscreen. Also, avoid taking these with dairy products, as calcium can bind the antibiotic, making it unavailable to your body.    Avoid excessive alcohol, spicy food consumption or sun  exposure.      Eczema  Continue applying moisturizer regularly.  Continue using triamcinolone 0.1% cream as needed for control of itchiness and scaling on the arms, legs, and trunk. Never use on face nor groin.      TT: 20 minutes.  CT: 15 minutes.    The information in this document, created by the medical scribe for me, accurately reflects the services I personally performed and the decisions made by me. I have reviewed and approved this document for accuracy prior to leaving the patient care area.  April 22, 2019 8:39 AM  Kathleen Cardona MD  [facility]

## 2019-04-22 NOTE — PATIENT INSTRUCTIONS
FUTURE APPOINTMENTS  Follow up in 3 month(s).    TOPICAL MEDICATION INSTRUCTIONS  Azelaic acid 15% gel    Apply a layer to the affected area(s) on face two times per day, everyday regardless if symptoms are present or not.    Keep in mind to also regularly use moisturizer, as this preventative measure can help maintain your skin's natural moisture barrier.    Apply moisturizer after application of medication.    ORAL ANTIBIOTIC  Take by mouth doxycycline 100 mg two time(s) a day for 1 more month  After 1 month, take by mouth doxycycline 50 mg twice a day.    TETRACYCLINE ANTIBIOTIC INFORMATION  Minocycline and doxycycline are tetracycline antibiotics and can increase your sun sensitivity, so make sure to be vigilant in regularly applying sunscreen. Also, avoid taking these with dairy products, as calcium can bind the antibiotic, making it unavailable to your body.    Avoid excessive alcohol, spicy food consumption or sun exposure.      Eczema  Continue applying moisturizer regularly.  Continue using triamcinolone 0.1% cream as needed for control of itchiness and scaling on the arms, legs, and trunk. Never use on face nor groin.

## 2019-04-22 NOTE — LETTER
"    4/22/2019         RE: Bacilio Wolf  [address]  [place] [address]        Dear Colleague,    Thank you for referring your patient, Bacilio Wolf, to the [facility]. Please see a copy of my visit note below.    [facility] - PRIMARY CARE SKIN    CC: rosacea  SUBJECTIVE:   Bacilio Wolf is a(n) 43 year old male who presents to clinic today for follow-up of rosacea that started 2 years ago, although symptoms may have been chronic since younger.    Current treatment:   azelaic acid 15% gel BID  Doxycycline 100 mg BID.  He is trying to apply sunscreen four times a day.  Response to treatment: Symptoms on the face are greatly improved.  Side effects of treatment noted: None.    Previous therapies: metronidazole 0.75% gel    Facial symptoms include: Facial redness, welts on the face, in the ears, and behind the ears, crusting areas on the ears and face that burst spontaneously. Facial redness waxes and wanes.  Eye irritation: YES - \"a little bit, sometimes\"    Aggravating factors :     Sun: ?YES.     Alcohol: YES.    Specific foods: breaded/fried foods, spicy foods.  Discontinuation of these products has not provided relief.  Relieving factors : none identified    Products used: He has been using Vanicream facial moisturizer.    Eczema  He is continuing to use triamcinolone 0.1% cream as needed for control of symptoms. He has had a reaction of swelling to blood pressure medications.    Personal Medical History  Skin cancer: NO  Eczema Psoriasis Autoimmune   NO ?YES NO   Other: ?rosacea    Family Medical History  Rosacea: NO.  Skin cancer: NO  Eczema Psoriasis Autoimmune   NO NO NO     Occupation: office, [employer] (indoor).    Social History     Tobacco Use     Smoking status: Never Smoker     Smokeless tobacco: Never Used   Substance Use Topics     Alcohol use: Yes     Drug use: No     Refer to electronic medical record (EMR) for past medical history and " medications.    INTEGUMENTARY/SKIN: POSITIVE for non-healing lesions and rash  ROS: 14 point review of systems was negative except the symptoms listed above in the HPI.    This document serves as a record of the services and decisions personally performed and made by Kathleen Cardona MD and was created by Jered Vera, a trained medical scribe, based on personal observations and provider statements to the medical scribe.  April 22, 2019 8:40 AM   Jerde Vera    OBJECTIVE:   GENERAL: healthy, alert and in mild distress.  SKIN: Grace Skin Type - I.  Face examined. The dermatoscope was used to help evaluate pigmented lesions.  Skin Pertinent Findings:  Face: overall improved but still significant baseline erythema on the nose and mid-portion of cheeks. Telangiectasias. A few papules on the nose.    ASSESSMENT:     Encounter Diagnosis   Name Primary?     Rosacea Yes     MDM:   Risk, benefits and alternative treatments were discussed with the patient. Cleansing program was discussed with the patient. Gentle cleansers were recommended. I discussed the importance of treating the skin gently and discussed triggers that could cause a flare. I discussed that this is usually a chronic condition that can be controlled, but not cured.    PLAN:   Patient Instructions   FUTURE APPOINTMENTS  Follow up in 3 month(s).    TOPICAL MEDICATION INSTRUCTIONS  Azelaic acid 15% gel    Apply a layer to the affected area(s) on face two times per day, everyday regardless if symptoms are present or not.    Keep in mind to also regularly use moisturizer, as this preventative measure can help maintain your skin's natural moisture barrier.    Apply moisturizer after application of medication.    ORAL ANTIBIOTIC  Take by mouth doxycycline 100 mg two time(s) a day for 1 more month  After 1 month, take by mouth doxycycline 50 mg twice a day.    TETRACYCLINE ANTIBIOTIC INFORMATION  Minocycline and doxycycline are tetracycline antibiotics and can  increase your sun sensitivity, so make sure to be vigilant in regularly applying sunscreen. Also, avoid taking these with dairy products, as calcium can bind the antibiotic, making it unavailable to your body.    Avoid excessive alcohol, spicy food consumption or sun exposure.      Eczema  Continue applying moisturizer regularly.  Continue using triamcinolone 0.1% cream as needed for control of itchiness and scaling on the arms, legs, and trunk. Never use on face nor groin.      TT: 20 minutes.  CT: 15 minutes.    The information in this document, created by the medical scribe for me, accurately reflects the services I personally performed and the decisions made by me. I have reviewed and approved this document for accuracy prior to leaving the patient care area.  April 22, 2019 8:39 AM  Kathleen Cardona MD  [facility]    Again, thank you for allowing me to participate in the care of your patient.        Sincerely,        Kathleen Cardona MD

## 2019-07-01 DIAGNOSIS — I10 BENIGN ESSENTIAL HYPERTENSION: ICD-10-CM

## 2019-07-01 NOTE — TELEPHONE ENCOUNTER
"Requested Prescriptions   Pending Prescriptions Disp Refills     lisinopril-hydrochlorothiazide (PRINZIDE/ZESTORETIC) 20-25 MG tablet 90 tablet 0     Sig: Take 1 tablet by mouth daily  Last Written Prescription Date:  3/22/19  Last Fill Quantity: 90,  # refills: 0   Last office visit: 4/22/2019 with prescribing provider:  Brendan   Future Office Visit:   Next 5 appointments (look out 90 days)    Jul 23, 2019  8:40 AM CDT  Return Visit with Kathleen Cardona MD  [facility] ([facility]) [address] [place] [id number]  [phone]   Aug 19, 2019  7:00 AM CDT  PHYSICAL with Ari Danielson PA-C  [facility] ([facility]) [address] [place] [id number]  [phone]                Diuretics (Including Combos) Protocol Failed - 7/1/2019 12:17 PM        Failed - Recent (12 mo) or future (30 days) visit within the authorizing provider's specialty     Patient had office visit in the last 12 months or has a visit in the next 30 days with authorizing provider or within the authorizing provider's specialty.  See \"Patient Info\" tab in inbasket, or \"Choose Columns\" in Meds & Orders section of the refill encounter.              Passed - Blood pressure under 140/90 in past 12 months     BP Readings from Last 3 Encounters:   04/22/19 134/80   03/22/19 136/89   03/04/19 (!) 136/98                 Passed - Medication is active on med list        Passed - Patient is age 18 or older        Passed - Normal serum creatinine on file in past 12 months     Recent Labs   Lab Test 03/22/19  1350   CR 0.68              Passed - Normal serum potassium on file in past 12 months     Recent Labs   Lab Test 03/22/19  1350   POTASSIUM 3.6                    Passed - Normal serum sodium on file in past 12 months     Recent Labs   Lab Test 03/22/19  1350                   "

## 2019-07-02 RX ORDER — LISINOPRIL AND HYDROCHLOROTHIAZIDE 20; 25 MG/1; MG/1
1 TABLET ORAL DAILY
Qty: 90 TABLET | Refills: 2 | Status: SHIPPED | OUTPATIENT
Start: 2019-07-02 | End: 2019-09-17

## 2019-07-02 NOTE — TELEPHONE ENCOUNTER
Prescription approved per [facility] Refill Protocol.  Willa Wong RN   [facility] - Triage

## 2019-07-23 ENCOUNTER — OFFICE VISIT (OUTPATIENT)
Dept: FAMILY MEDICINE | Facility: CLINIC | Age: 44
End: 2019-07-23
Payer: COMMERCIAL

## 2019-07-23 VITALS — SYSTOLIC BLOOD PRESSURE: 122 MMHG | DIASTOLIC BLOOD PRESSURE: 78 MMHG

## 2019-07-23 DIAGNOSIS — L30.9 ECZEMA, UNSPECIFIED TYPE: ICD-10-CM

## 2019-07-23 DIAGNOSIS — L71.9 ROSACEA: Primary | ICD-10-CM

## 2019-07-23 PROCEDURE — 99213 OFFICE O/P EST LOW 20 MIN: CPT | Performed by: FAMILY MEDICINE

## 2019-07-23 RX ORDER — DOXYCYCLINE 50 MG/1
50 CAPSULE ORAL 2 TIMES DAILY
Qty: 180 CAPSULE | Refills: 3 | Status: SHIPPED | OUTPATIENT
Start: 2019-07-23 | End: 2020-07-23

## 2019-07-23 RX ORDER — TRIAMCINOLONE ACETONIDE 1 MG/G
CREAM TOPICAL
Qty: 80 G | Refills: 0 | Status: SHIPPED | OUTPATIENT
Start: 2019-07-23 | End: 2020-03-10

## 2019-07-23 NOTE — PATIENT INSTRUCTIONS
Rosacea - one year      azelaic acid apply two times per day     Doxycycline 50 mg one tab two per day    Recheck sooner if not controlled.

## 2019-07-23 NOTE — LETTER
7/23/2019         RE: Bacilio Wolf  [address]  [place] [address]        Dear Colleague,    Thank you for referring your patient, Bacilio Wolf, to the [facility]. Please see a copy of my visit note below.    [facility] - PRIMARY CARE SKIN    CC: rosacea  SUBJECTIVE:   Bacilio Wolf is a(n) 43 year old male who presents to clinic today for follow-up of rosacea that started 2 years ago, although symptoms may have been chronic since younger.    Current treatment:   azelaic acid 15% gel BID  Doxycycline 100 mg QD  He is trying to apply sunscreen four times a day.  Response to treatment: Symptoms on the face are greatly improved. Red wine aggravates it.  Side effects of treatment noted: None.    Previous therapies: metronidazole 0.75% gel    Facial symptoms include: Facial redness varies depending if he has had some red wine    Aggravating factors :     Sun: ?YES.     Alcohol: YES.    Specific foods: breaded/fried foods, spicy foods.  Discontinuation of these products has not provided relief.  Relieving factors : none identified    Products used: He has been using Vanicream facial moisturizer.      Personal Medical History  Skin cancer: NO  Eczema Psoriasis Autoimmune   NO ?YES NO   Other: ?rosacea    Family Medical History  Rosacea: NO.  Skin cancer: NO  Eczema Psoriasis Autoimmune   NO NO NO     Occupation: office, [employer] (indoor).    Social History     Tobacco Use     Smoking status: Never Smoker     Smokeless tobacco: Never Used   Substance Use Topics     Alcohol use: Yes     Drug use: No     Refer to electronic medical record (EMR) for past medical history and medications.    INTEGUMENTARY/SKIN: POSITIVE for non-healing lesions and rash  ROS: 14 point review of systems was negative except the symptoms listed above in the HPI.        OBJECTIVE:   GENERAL: healthy, alert and in mild distress.  SKIN: Grace Skin Type - I.  Face examined. The  dermatoscope was used to help evaluate pigmented lesions.  Skin Pertinent Findings:  Face: overall improved but still significant baseline erythema on the nose and mid-portion of cheeks. Telangiectasias. Some thickening of the skin on the nose    ASSESSMENT:     Encounter Diagnoses   Name Primary?     Rosacea Yes     Eczema, unspecified type      MDM:   Risk, benefits and alternative treatments were discussed with the patient. Cleansing program was discussed with the patient. Gentle cleansers were recommended. I discussed the importance of treating the skin gently and discussed triggers that could cause a flare. I discussed that this is usually a chronic condition that can be controlled, but not cured.    PLAN:   Patient Instructions   Rosacea - one year      azelaic acid apply two times per day     Doxycycline 50 mg one tab two per day    Recheck sooner if not controlled.      TT: 20 minutes.  CT: 15 minutes.        Again, thank you for allowing me to participate in the care of your patient.        Sincerely,        Kathleen Cardona MD

## 2019-07-23 NOTE — PROGRESS NOTES
[facility] - PRIMARY CARE SKIN    CC: rosacea  SUBJECTIVE:   Bacilio Wolf is a(n) 43 year old male who presents to clinic today for follow-up of rosacea that started 2 years ago, although symptoms may have been chronic since younger.    Current treatment:   azelaic acid 15% gel BID  Doxycycline 100 mg QD  He is trying to apply sunscreen four times a day.  Response to treatment: Symptoms on the face are greatly improved. Red wine aggravates it.  Side effects of treatment noted: None.    Previous therapies: metronidazole 0.75% gel    Facial symptoms include: Facial redness varies depending if he has had some red wine    Aggravating factors :     Sun: ?YES.     Alcohol: YES.    Specific foods: breaded/fried foods, spicy foods.  Discontinuation of these products has not provided relief.  Relieving factors : none identified    Products used: He has been using Vanicream facial moisturizer.      Personal Medical History  Skin cancer: NO  Eczema Psoriasis Autoimmune   NO ?YES NO   Other: ?rosacea    Family Medical History  Rosacea: NO.  Skin cancer: NO  Eczema Psoriasis Autoimmune   NO NO NO     Occupation: office, [employer] (indoor).    Social History     Tobacco Use     Smoking status: Never Smoker     Smokeless tobacco: Never Used   Substance Use Topics     Alcohol use: Yes     Drug use: No     Refer to electronic medical record (EMR) for past medical history and medications.    INTEGUMENTARY/SKIN: POSITIVE for non-healing lesions and rash  ROS: 14 point review of systems was negative except the symptoms listed above in the HPI.        OBJECTIVE:   GENERAL: healthy, alert and in mild distress.  SKIN: Grace Skin Type - I.  Face examined. The dermatoscope was used to help evaluate pigmented lesions.  Skin Pertinent Findings:  Face: overall improved but still significant baseline erythema on the nose and mid-portion of cheeks. Telangiectasias. Some thickening of the skin on the nose    ASSESSMENT:      Encounter Diagnoses   Name Primary?     Rosacea Yes     Eczema, unspecified type      MDM:   Risk, benefits and alternative treatments were discussed with the patient. Cleansing program was discussed with the patient. Gentle cleansers were recommended. I discussed the importance of treating the skin gently and discussed triggers that could cause a flare. I discussed that this is usually a chronic condition that can be controlled, but not cured.    PLAN:   Patient Instructions   Rosacea - one year      azelaic acid apply two times per day     Doxycycline 50 mg one tab two per day    Recheck sooner if not controlled.      TT: 20 minutes.  CT: 15 minutes.

## 2019-09-17 ENCOUNTER — OFFICE VISIT (OUTPATIENT)
Dept: FAMILY MEDICINE | Facility: CLINIC | Age: 44
End: 2019-09-17
Payer: COMMERCIAL

## 2019-09-17 VITALS
WEIGHT: 211 LBS | SYSTOLIC BLOOD PRESSURE: 126 MMHG | HEIGHT: 71 IN | RESPIRATION RATE: 14 BRPM | OXYGEN SATURATION: 97 % | HEART RATE: 86 BPM | TEMPERATURE: 96.8 F | DIASTOLIC BLOOD PRESSURE: 84 MMHG | BODY MASS INDEX: 29.54 KG/M2

## 2019-09-17 DIAGNOSIS — M25.511 CHRONIC RIGHT SHOULDER PAIN: ICD-10-CM

## 2019-09-17 DIAGNOSIS — E66.3 OVERWEIGHT (BMI 25.0-29.9): ICD-10-CM

## 2019-09-17 DIAGNOSIS — Z00.00 ROUTINE GENERAL MEDICAL EXAMINATION AT A HEALTH CARE FACILITY: Primary | ICD-10-CM

## 2019-09-17 DIAGNOSIS — I10 BENIGN ESSENTIAL HYPERTENSION: ICD-10-CM

## 2019-09-17 DIAGNOSIS — G89.29 CHRONIC RIGHT SHOULDER PAIN: ICD-10-CM

## 2019-09-17 LAB
CHOLEST SERPL-MCNC: 217 MG/DL
HDLC SERPL-MCNC: 89 MG/DL
LDLC SERPL CALC-MCNC: 112 MG/DL
NONHDLC SERPL-MCNC: 128 MG/DL
TRIGL SERPL-MCNC: 82 MG/DL

## 2019-09-17 PROCEDURE — 80061 LIPID PANEL: CPT | Performed by: PHYSICIAN ASSISTANT

## 2019-09-17 PROCEDURE — 99396 PREV VISIT EST AGE 40-64: CPT | Performed by: PHYSICIAN ASSISTANT

## 2019-09-17 PROCEDURE — 36415 COLL VENOUS BLD VENIPUNCTURE: CPT | Performed by: PHYSICIAN ASSISTANT

## 2019-09-17 RX ORDER — LISINOPRIL AND HYDROCHLOROTHIAZIDE 20; 25 MG/1; MG/1
1 TABLET ORAL DAILY
Qty: 90 TABLET | Refills: 2 | Status: SHIPPED | OUTPATIENT
Start: 2019-09-17 | End: 2020-11-13

## 2019-09-17 ASSESSMENT — MIFFLIN-ST. JEOR: SCORE: 1864.22

## 2019-09-17 NOTE — PROGRESS NOTES
SUBJECTIVE:   CC: Bacilio Wolf is an 43 year old male who presents for preventive health visit.     Healthy Habits:    Do you get at least three servings of calcium containing foods daily (dairy, green leafy vegetables, etc.)? yes    Amount of exercise or daily activities, outside of work: not much    Problems taking medications regularly No    Medication side effects: No    Have you had an eye exam in the past two years? no    Do you see a dentist twice per year? yes    Do you have sleep apnea, excessive snoring or daytime drowsiness?no    Bacilio has chronic right shoulder and left hamstring pain.  He has torn his hamstring three different ties and has had two previous surgeries on his right shoulder.  This causes him pain with exercise.  He has done PT in the past and is unsure if this was helpful.         Today's PHQ-2 Score:   PHQ-2 ( 1999 Pf[employer]) 3/22/2019 2/18/2019   Q1: Little interest or pleasure in doing things 0 0   Q2: Feeling down, depressed or hopeless 0 0   PHQ-2 Score 0 0       Abuse: Current or Past(Physical, Sexual or Emotional)- No  Do you feel safe in your environment? Yes    Social History     Tobacco Use     Smoking status: Never Smoker     Smokeless tobacco: Never Used   Substance Use Topics     Alcohol use: Yes     If you drink alcohol do you typically have >3 drinks per day or >7 drinks per week? Yes - AUDIT SCORE:                           Last PSA: No results found for: PSA    Reviewed orders with patient. Reviewed health maintenance and updated orders accordingly - Yes  There is no problem list on file for this patient.    Past Surgical History:   Procedure Laterality Date     HERNIA REPAIR       SHOULDER SURGERY         Social History     Tobacco Use     Smoking status: Never Smoker     Smokeless tobacco: Never Used   Substance Use Topics     Alcohol use: Yes     Family History   Problem Relation Age of Onset     Family History Negative Mother          Current Outpatient Medications    Medication Sig Dispense Refill     azelaic acid (FINACIA) 15 % external gel APPLY TO FACE BID 50 g 3     Cholecalciferol (D 1000) 1000 units CAPS        doxycycline monohydrate (MONODOX) 50 MG capsule Take 1 capsule (50 mg) by mouth 2 times daily 180 capsule 3     lisinopril-hydrochlorothiazide (PRINZIDE/ZESTORETIC) 20-25 MG tablet Take 1 tablet by mouth daily 90 tablet 2     multivitamin w/minerals (MULTI-VITAMIN) tablet Take 1 tablet by mouth daily       triamcinolone (KENALOG) 0.1 % external cream Apply to AA on legs bid when needed 80 g 0     Allergies   Allergen Reactions     Diphenhydramine Rash     Rash , confusion     Recent Labs   Lab Test 03/22/19  1350 02/18/19  1001   ALT 52  --    CR 0.68 0.75   GFRESTIMATED >90 >90   GFRESTBLACK >90 >90   POTASSIUM 3.6 3.5        Reviewed and updated as needed this visit by clinical staff  Tobacco  Allergies  Meds  Surg Hx  Fam Hx         Reviewed and updated as needed this visit by Provider  Surg Hx  Fam Hx        Past Medical History:   Diagnosis Date     HTN (hypertension)       Past Surgical History:   Procedure Laterality Date     HERNIA REPAIR       SHOULDER SURGERY       OB History   No data available       ROS:  CONSTITUTIONAL: NEGATIVE for fever, chills, change in weight  INTEGUMENTARY/SKIN: NEGATIVE for worrisome rashes, moles or lesions  EYES: NEGATIVE for vision changes or irritation  ENT: NEGATIVE for ear, mouth and throat problems  RESP: NEGATIVE for significant cough or SOB  CV: NEGATIVE for chest pain, palpitations or peripheral edema  GI: NEGATIVE for nausea, abdominal pain, heartburn, or change in bowel habits   male: negative for dysuria, hematuria, decreased urinary stream, erectile dysfunction, urethral discharge  MUSCULOSKELETAL: NEGATIVE for significant arthralgias or myalgia  NEURO: NEGATIVE for weakness, dizziness or paresthesias  PSYCHIATRIC: NEGATIVE for changes in mood or affect    OBJECTIVE:   /84   Pulse 86   Temp 96.8  " F (36  C) (Tympanic)   Resp 14   Ht 1.803 m (5' 11\")   Wt 95.7 kg (211 lb)   SpO2 97%   BMI 29.43 kg/m    EXAM:  GENERAL: healthy, alert and no distress  EYES: Eyes grossly normal to inspection, PERRL and conjunctivae and sclerae normal  HENT: ear canals and TM's normal, nose and mouth without ulcers or lesions  NECK: no adenopathy, no asymmetry, masses, or scars and thyroid normal to palpation  RESP: lungs clear to auscultation - no rales, rhonchi or wheezes  CV: regular rate and rhythm, normal S1 S2, no S3 or S4, no murmur, click or rub, no peripheral edema   ABDOMEN: soft, nontender, no hepatosplenomegaly, no masses and bowel sounds normal  MS: no gross musculoskeletal defects noted, no edema  SKIN: no suspicious lesions or rashes  NEURO: Normal strength and tone, mentation intact and speech normal  PSYCH: mentation appears normal, affect normal/bright    Diagnostic Test Results:  Labs reviewed in Epic    ASSESSMENT/PLAN:   1. Routine general medical examination at a health care facility  - Lipid panel reflex to direct LDL Fasting    2. Benign essential hypertension  controlled  - lisinopril-hydrochlorothiazide (PRINZIDE/ZESTORETIC) 20-25 MG tablet; Take 1 tablet by mouth daily  Dispense: 90 tablet; Refill: 2    3. Chronic right shoulder pain  Encouraged Bacilio to follow up with orthopaedics for reevaluation of his right shoulder and hamstrings to help determine next steps.   - ORTHOPEDICS ADULT REFERRAL    COUNSELING:  Reviewed preventive health counseling, as reflected in patient instructions       Regular exercise       Healthy diet/nutrition    Estimated body mass index is 29.43 kg/m  as calculated from the following:    Height as of this encounter: 1.803 m (5' 11\").    Weight as of this encounter: 95.7 kg (211 lb).    Weight management plan: Discussed healthy diet and exercise guidelines     reports that he has never smoked. He has never used smokeless tobacco.      Counseling Resources:  ATP IV " Guidelines  Pooled Cohorts Equation Calculator  FRAX Risk Assessment  ICSI Preventive Guidelines  Dietary Guidelines for Americans, 2010  USDA's MyPlate  ASA Prophylaxis  Lung CA Screening    Ari Danielson PA-C  [facility]

## 2019-09-17 NOTE — LETTER
September 17, 2019      Bacilio Wolf  [address]  [place] [address]        Dear ,    We are writing to inform you of your test results.      -LDL(bad) cholesterol level is elevated which can increase your heart disease risk.  A diet high in fat and simple carbohydrates, genetics and being overweight can contribute to this. ADVISE: exercising 150 minutes of aerobic exercise per week (30 minutes for 5 days per week or 50 minutes for 3 days per week are options) and eating a low saturated fat/low carbohydrate diet are helpful to improve this.      Resulted Orders   Lipid panel reflex to direct LDL Fasting   Result Value Ref Range    Cholesterol 217 (H) <200 mg/dL      Comment:      Desirable:       <200 mg/dl    Triglycerides 82 <150 mg/dL      Comment:      Fasting specimen    HDL Cholesterol 89 >39 mg/dL    LDL Cholesterol Calculated 112 (H) <100 mg/dL      Comment:      Above desirable:  100-129 mg/dl  Borderline High:  130-159 mg/dL  High:             160-189 mg/dL  Very high:       >189 mg/dl      Non HDL Cholesterol 128 <130 mg/dL       If you have any questions or concerns, please call the clinic at the number listed above.       Sincerely,          Ari Danielson PA-C

## 2019-09-18 NOTE — RESULT ENCOUNTER NOTE
Letter sent to patient with results.    Ari Danielson PA-C  [facility]

## 2020-03-09 DIAGNOSIS — L30.9 ECZEMA, UNSPECIFIED TYPE: ICD-10-CM

## 2020-03-09 DIAGNOSIS — L71.8 ACNE ROSACEA, PAPULAR TYPE: ICD-10-CM

## 2020-03-10 RX ORDER — AZELAIC ACID 0.15 G/G
GEL TOPICAL
Qty: 50 G | Refills: 0 | Status: SHIPPED | OUTPATIENT
Start: 2020-03-10 | End: 2020-07-23

## 2020-03-10 RX ORDER — TRIAMCINOLONE ACETONIDE 1 MG/G
CREAM TOPICAL
Qty: 80 G | Refills: 0 | Status: SHIPPED | OUTPATIENT
Start: 2020-03-10 | End: 2020-07-23

## 2020-07-23 ENCOUNTER — VIRTUAL VISIT (OUTPATIENT)
Dept: FAMILY MEDICINE | Facility: CLINIC | Age: 45
End: 2020-07-23
Payer: COMMERCIAL

## 2020-07-23 DIAGNOSIS — L30.9 ECZEMA, UNSPECIFIED TYPE: ICD-10-CM

## 2020-07-23 DIAGNOSIS — L71.8 ACNE ROSACEA, PAPULAR TYPE: ICD-10-CM

## 2020-07-23 DIAGNOSIS — L71.9 ROSACEA: ICD-10-CM

## 2020-07-23 PROCEDURE — 99213 OFFICE O/P EST LOW 20 MIN: CPT | Mod: 95 | Performed by: FAMILY MEDICINE

## 2020-07-23 RX ORDER — DOXYCYCLINE 50 MG/1
50 CAPSULE ORAL 2 TIMES DAILY
Qty: 180 CAPSULE | Refills: 3 | Status: SHIPPED | OUTPATIENT
Start: 2020-07-23 | End: 2021-08-20

## 2020-07-23 RX ORDER — TRIAMCINOLONE ACETONIDE 1 MG/G
CREAM TOPICAL
Qty: 80 G | Refills: 0 | Status: SHIPPED | OUTPATIENT
Start: 2020-07-23

## 2020-07-23 RX ORDER — AZELAIC ACID 0.15 G/G
GEL TOPICAL
Qty: 50 G | Refills: 3 | Status: SHIPPED | OUTPATIENT
Start: 2020-07-23 | End: 2021-09-09

## 2020-07-23 NOTE — LETTER
7/23/2020         RE: Bacilio Wolf  [address]  [place] [address]        Dear Colleague,    Thank you for referring your patient, Bacilio Wolf, to the [facility] [place]. Please see a copy of my visit note below.    [facility] - PRIMARY CARE SKIN    CC: rosacea  SUBJECTIVE:   Bacilio Wolf is a(n) 44 year old male who presents for virtual visit because of COVID-19 pandemic follow-up of rosacea that started 3 years ago, although symptoms may have been chronic since younger.    Current treatment:   azelaic acid 15% gel BID  Doxycycline 50 mg bid  He is trying to apply sunscreen four times a day.  Response to treatment: this regiment works if it sticks to it.       Previous therapies: metronidazole 0.75% gel  Facial symptoms include: Facial redness varies depending if he has had some red wine    Aggravating factors :     Sun: ?YES.     Alcohol: YES.    Specific foods: breaded/fried foods, spicy foods.  Discontinuation of these products has not provided relief.  Relieving factors : none identified    Issue two : Leg dermatitis he is using triamcinolone 0.1% cream on an as needed basis      Personal Medical History  Skin cancer: NO  Eczema Psoriasis Autoimmune   NO ?YES NO   Other: ?rosacea    Family Medical History  Rosacea: NO.  Skin cancer: NO  Eczema Psoriasis Autoimmune   NO NO NO     Occupation: office, [employer] (indoor).    Social History     Tobacco Use     Smoking status: Never Smoker     Smokeless tobacco: Never Used   Substance Use Topics     Alcohol use: Yes     Drug use: No     Refer to electronic medical record (EMR) for past medical history and medications.    INTEGUMENTARY/SKIN: POSITIVE for non-healing lesions and rash  ROS: 14 point review of systems was negative except the symptoms listed above in the HPI.        OBJECTIVE:   GENERAL: healthy, alert and in mild distress.  SKIN: Grace Skin Type - I.  Face examined. The dermatoscope was used to  "help evaluate pigmented lesions.  Skin Pertinent Findings:  Face: digital photo - minimal erythema    ASSESSMENT:     Encounter Diagnoses   Name Primary?     Rosacea      Acne rosacea, papular type      Eczema, unspecified type      MDM:   Risk, benefits and alternative treatments were discussed with the patient. Cleansing program was discussed with the patient. Gentle cleansers were recommended. I discussed the importance of treating the skin gently and discussed triggers that could cause a flare. I discussed that this is usually a chronic condition that can be controlled, but not cured.    PLAN:   Patient Instructions   Rosacea      Azelaic acid 15 % gel apply two times per day    Doxycycline 50 mg one cap 1-2 times per day\    Recheck in one year    Leg dermatitis :      Triamcinolone 0.1% cream apply two times when needed      TT: 12 minutes.          Bacilio Wolf is a 44 year old male who is being evaluated via a billable video visit.      The patient has been notified of following:     \"This video visit will be conducted via a call between you and your physician/provider. We have found that certain health care needs can be provided without the need for an in-person physical exam.  This service lets us provide the care you need with a video conversation.  If a prescription is necessary we can send it directly to your pharmacy.  If lab work is needed we can place an order for that and you can then stop by our lab to have the test done at a later time.    Video visits are billed at different rates depending on your insurance coverage.  Please reach out to your insurance provider with any questions.    If during the course of the call the physician/provider feels a video visit is not appropriate, you will not be charged for this service.\"    Patient has given verbal consent for Video visit? Yes    How would you like to obtain your AVS? Bessiehart    Patient would like the video invitation sent by: Text to cell " phone: [phone]      Again, thank you for allowing me to participate in the care of your patient.        Sincerely,        Kathleen Cardona MD

## 2020-07-23 NOTE — PROGRESS NOTES
[facility] - PRIMARY CARE SKIN    CC: rosacea  SUBJECTIVE:   Bacilio Wolf is a(n) 44 year old male who presents for virtual visit because of COVID-19 pandemic follow-up of rosacea that started 3 years ago, although symptoms may have been chronic since younger.    Current treatment:   azelaic acid 15% gel BID  Doxycycline 50 mg bid  He is trying to apply sunscreen four times a day.  Response to treatment: this regiment works if it sticks to it.       Previous therapies: metronidazole 0.75% gel  Facial symptoms include: Facial redness varies depending if he has had some red wine    Aggravating factors :     Sun: ?YES.     Alcohol: YES.    Specific foods: breaded/fried foods, spicy foods.  Discontinuation of these products has not provided relief.  Relieving factors : none identified    Issue two : Leg dermatitis he is using triamcinolone 0.1% cream on an as needed basis      Personal Medical History  Skin cancer: NO  Eczema Psoriasis Autoimmune   NO ?YES NO   Other: ?rosacea    Family Medical History  Rosacea: NO.  Skin cancer: NO  Eczema Psoriasis Autoimmune   NO NO NO     Occupation: office, [employer] (indoor).    Social History     Tobacco Use     Smoking status: Never Smoker     Smokeless tobacco: Never Used   Substance Use Topics     Alcohol use: Yes     Drug use: No     Refer to electronic medical record (EMR) for past medical history and medications.    INTEGUMENTARY/SKIN: POSITIVE for non-healing lesions and rash  ROS: 14 point review of systems was negative except the symptoms listed above in the HPI.        OBJECTIVE:   GENERAL: healthy, alert and in mild distress.  SKIN: Grace Skin Type - I.  Face examined. The dermatoscope was used to help evaluate pigmented lesions.  Skin Pertinent Findings:  Face: digital photo - minimal erythema    ASSESSMENT:     Encounter Diagnoses   Name Primary?     Rosacea      Acne rosacea, papular type      Eczema, unspecified type      MDM:   Risk, benefits and  "alternative treatments were discussed with the patient. Cleansing program was discussed with the patient. Gentle cleansers were recommended. I discussed the importance of treating the skin gently and discussed triggers that could cause a flare. I discussed that this is usually a chronic condition that can be controlled, but not cured.    PLAN:   Patient Instructions   Rosacea      Azelaic acid 15 % gel apply two times per day    Doxycycline 50 mg one cap 1-2 times per day\    Recheck in one year    Leg dermatitis :      Triamcinolone 0.1% cream apply two times when needed      TT: 12 minutes.          Bacilio Wolf is a 44 year old male who is being evaluated via a billable video visit.      The patient has been notified of following:     \"This video visit will be conducted via a call between you and your physician/provider. We have found that certain health care needs can be provided without the need for an in-person physical exam.  This service lets us provide the care you need with a video conversation.  If a prescription is necessary we can send it directly to your pharmacy.  If lab work is needed we can place an order for that and you can then stop by our lab to have the test done at a later time.    Video visits are billed at different rates depending on your insurance coverage.  Please reach out to your insurance provider with any questions.    If during the course of the call the physician/provider feels a video visit is not appropriate, you will not be charged for this service.\"    Patient has given verbal consent for Video visit? Yes    How would you like to obtain your AVS? Evy    Patient would like the video invitation sent by: Text to cell phone: [phone]    "

## 2020-11-13 DIAGNOSIS — I10 BENIGN ESSENTIAL HYPERTENSION: ICD-10-CM

## 2020-11-13 RX ORDER — LISINOPRIL AND HYDROCHLOROTHIAZIDE 20; 25 MG/1; MG/1
TABLET ORAL
Qty: 90 TABLET | Refills: 0 | Status: SHIPPED | OUTPATIENT
Start: 2020-11-13 | End: 2020-12-03

## 2020-11-13 NOTE — TELEPHONE ENCOUNTER
Patient due for fasting office visit- 90 days supply given.  Routing to team to schedule appointment     Keesha RENO RN  [facility]  [phone]

## 2020-11-16 NOTE — TELEPHONE ENCOUNTER
[employer] message sent to patient to schedule annual exam and fasting labs.    April Madera on 11/16/2020 at 11:48 AM

## 2020-12-03 ENCOUNTER — ANCILLARY PROCEDURE (OUTPATIENT)
Dept: GENERAL RADIOLOGY | Facility: CLINIC | Age: 45
End: 2020-12-03
Attending: PHYSICIAN ASSISTANT
Payer: COMMERCIAL

## 2020-12-03 ENCOUNTER — OFFICE VISIT (OUTPATIENT)
Dept: FAMILY MEDICINE | Facility: CLINIC | Age: 45
End: 2020-12-03
Payer: COMMERCIAL

## 2020-12-03 VITALS
TEMPERATURE: 98.2 F | SYSTOLIC BLOOD PRESSURE: 118 MMHG | WEIGHT: 202 LBS | HEIGHT: 71 IN | BODY MASS INDEX: 28.28 KG/M2 | DIASTOLIC BLOOD PRESSURE: 82 MMHG | HEART RATE: 113 BPM | OXYGEN SATURATION: 100 %

## 2020-12-03 DIAGNOSIS — M79.89 SOFT TISSUE MASS: Primary | ICD-10-CM

## 2020-12-03 DIAGNOSIS — M79.89 SOFT TISSUE MASS: ICD-10-CM

## 2020-12-03 DIAGNOSIS — I10 BENIGN ESSENTIAL HYPERTENSION: ICD-10-CM

## 2020-12-03 LAB
ANION GAP SERPL CALCULATED.3IONS-SCNC: 6 MMOL/L (ref 3–14)
BUN SERPL-MCNC: 15 MG/DL (ref 7–30)
CALCIUM SERPL-MCNC: 9.4 MG/DL (ref 8.5–10.1)
CHLORIDE SERPL-SCNC: 98 MMOL/L (ref 94–109)
CO2 SERPL-SCNC: 27 MMOL/L (ref 20–32)
CREAT SERPL-MCNC: 0.84 MG/DL (ref 0.66–1.25)
GFR SERPL CREATININE-BSD FRML MDRD: >90 ML/MIN/{1.73_M2}
GLUCOSE SERPL-MCNC: 109 MG/DL (ref 70–99)
POTASSIUM SERPL-SCNC: 4 MMOL/L (ref 3.4–5.3)
SODIUM SERPL-SCNC: 131 MMOL/L (ref 133–144)

## 2020-12-03 PROCEDURE — 71046 X-RAY EXAM CHEST 2 VIEWS: CPT | Performed by: RADIOLOGY

## 2020-12-03 PROCEDURE — 36415 COLL VENOUS BLD VENIPUNCTURE: CPT | Performed by: PHYSICIAN ASSISTANT

## 2020-12-03 PROCEDURE — 80048 BASIC METABOLIC PNL TOTAL CA: CPT | Performed by: PHYSICIAN ASSISTANT

## 2020-12-03 PROCEDURE — 99213 OFFICE O/P EST LOW 20 MIN: CPT | Performed by: PHYSICIAN ASSISTANT

## 2020-12-03 RX ORDER — LISINOPRIL AND HYDROCHLOROTHIAZIDE 20; 25 MG/1; MG/1
1 TABLET ORAL DAILY
Qty: 90 TABLET | Refills: 1 | Status: SHIPPED | OUTPATIENT
Start: 2020-12-03 | End: 2023-12-08

## 2020-12-03 ASSESSMENT — MIFFLIN-ST. JEOR: SCORE: 1813.4

## 2020-12-07 NOTE — RESULT ENCOUNTER NOTE
Bacilio-  Here are your recent results.     Your sodium is slightly low (131).  We should recheck this in 2 weeks.  If persistently low, we will need to change your blood pressure medication.       If you have any questions please do not hesitate to contact our office via phone ([phone]) or you may send me a message via [employer] by clicking the contact my Care Team link.      It was a pleasure meeting you and participating in your care!    Thank you,    Ari Danielson MPH, PA-C  [address] [place] [id number] [phone]

## 2020-12-10 ENCOUNTER — HOSPITAL ENCOUNTER (OUTPATIENT)
Dept: MAMMOGRAPHY | Facility: CLINIC | Age: 45
End: 2020-12-10
Attending: PHYSICIAN ASSISTANT
Payer: COMMERCIAL

## 2020-12-10 DIAGNOSIS — M79.89 SOFT TISSUE MASS: ICD-10-CM

## 2020-12-10 PROCEDURE — 76604 US EXAM CHEST: CPT

## 2020-12-11 NOTE — RESULT ENCOUNTER NOTE
Bacilio-  Here are your recent results.     Your chest wall ultrasound was normal and did not appreciate any mass in the area of concern.  This area likely represents prominent bone.  Please monitor your symptoms.  If the are is increasing in size or becoming painful, please let me know      If you have any questions please do not hesitate to contact our office via phone ([phone]) or you may send me a message via [employer] by clicking the contact my Care Team link.      Thank you,    Ari Danielson MPH, PA-C  [address] [place] [id number] [phone]

## 2021-01-03 ENCOUNTER — HEALTH MAINTENANCE LETTER (OUTPATIENT)
Age: 46
End: 2021-01-03

## 2021-08-19 DIAGNOSIS — L71.9 ROSACEA: ICD-10-CM

## 2021-08-20 RX ORDER — DOXYCYCLINE 50 MG/1
CAPSULE ORAL
Qty: 60 CAPSULE | Refills: 0 | Status: SHIPPED | OUTPATIENT
Start: 2021-08-20 | End: 2021-09-09

## 2021-09-09 ENCOUNTER — OFFICE VISIT (OUTPATIENT)
Dept: FAMILY MEDICINE | Facility: CLINIC | Age: 46
End: 2021-09-09
Payer: COMMERCIAL

## 2021-09-09 VITALS — DIASTOLIC BLOOD PRESSURE: 78 MMHG | SYSTOLIC BLOOD PRESSURE: 122 MMHG

## 2021-09-09 DIAGNOSIS — L30.9 ECZEMA, UNSPECIFIED TYPE: Primary | ICD-10-CM

## 2021-09-09 DIAGNOSIS — L71.9 ACNE ROSACEA: ICD-10-CM

## 2021-09-09 PROCEDURE — 99213 OFFICE O/P EST LOW 20 MIN: CPT | Performed by: PHYSICIAN ASSISTANT

## 2021-09-09 RX ORDER — DOXYCYCLINE 50 MG/1
50 CAPSULE ORAL DAILY
Qty: 90 CAPSULE | Refills: 3 | Status: SHIPPED | OUTPATIENT
Start: 2021-09-09 | End: 2023-12-08

## 2021-09-09 RX ORDER — AZELAIC ACID 0.15 G/G
GEL TOPICAL
Qty: 50 G | Refills: 3 | Status: SHIPPED | OUTPATIENT
Start: 2021-09-09 | End: 2021-09-09

## 2021-09-09 NOTE — PATIENT INSTRUCTIONS
Rosacea      Azelaic acid 15 % gel apply two times per day    Doxycycline 50 mg one cap 1-2 times per day\  Treatment options including topical Finacea, Sulfa, Metronidazole, Ivermectin, and oral Doxycyline. Redness of rosacea is more difficult to treat and treatment is considered cosmetic.  Redness can be treated with lasers, Mirvaso or Rofade.    Avoid triggers such as sunlight, spicy foods, and alcohol. Wear sunscreen everyday of the year.     Wear a sunscreen with at least SPF 30 on your face, ears, neck and V of the chest daily. Wear sunscreen on other areas of the body if those areas are exposed to the sun throughout the day. Sunscreens can contain physical and/or chemical blockers. Physical blockers are less likely to clog pores, these include zinc oxide and titanium dioxide. Reapply every two hour and after swimming.     Sunscreen examples: https://www.ewg.org/sunscreen/     Leg dermatitis :  Triamcinolone 0.1% cream apply two times when needed  Side effects of topical steroids including but not limited to atrophy (skin thinning), striae (stretch marks) telangiectasias, steroid acne, and others. Do not apply to normal skin. Do not apply to discolored skin that does not have rash present.   Proper skin care from [facility]:    -Eliminate harsh soaps as they strip the natural oils from the skin, often resulting in dry itchy skin ( i.e. Dial, Zest, Marga Spring)  -Use mild soaps such as Cetaphil or Dove Sensitive Skin in the shower. You do not need to use soap on arms, legs, and trunk every time you shower unless visibly soiled.   -Avoid hot or cold showers.  -After showering, lightly dry off and apply moisturizing within 2-3 minutes. This will help trap moisture in the skin.   -Aggressive use of a moisturizer at least 1-2 times a day to the entire body (including -Vanicream, Cetaphil, Aquaphor or Cerave) and moisturize hands after every washing.  -We recommend using moisturizers that come in a tub  that needs to be scooped out, not a pump. This has more of an oil base. It will hold moisture in your skin much better than a water base moisturizer. The above recommended are non-pore clogging.           Treatment options including topical Finacea, Sulfa, Metronidazole, Ivermectin, and oral Doxycyline. Redness of rosacea is more difficult to treat and treatment is considered cosmetic.  Redness can be treated with lasers, Mirvaso or Rofade.    Avoid triggers such as sunlight, spicy foods, and alcohol. Wear sunscreen everyday of the year.     Wear a sunscreen with at least SPF 30 on your face, ears, neck and V of the chest daily. Wear sunscreen on other areas of the body if those areas are exposed to the sun throughout the day. Sunscreens can contain physical and/or chemical blockers. Physical blockers are less likely to clog pores, these include zinc oxide and titanium dioxide. Reapply every two hour and after swimming.     Sunscreen examples: https://www.ewg.org/sunscreen/

## 2021-09-09 NOTE — LETTER
9/9/2021         RE: Bacilio Wolf  [address]  [place] [address]        Dear Colleague,    Thank you for referring your patient, Bacilio Wolf, to the [facility]. Please see a copy of my visit note below.    HPI:  Bacilio Wolf is a 45 year old male patient here today for follow up rosacea on face .  Patient states this has been present for 4 years  Patient reports the following symptoms: breakout and redness .  Patient reports the following previous treatments: doxy 50mg every day-every other day and azalaic acid 1-2x a day with improvement. Pt would like refills. Tolerating medication well. .  Patient reports the following modifying factors: none.  Associated symptoms: none. Also has a history of eczema on legs. Uses TAc 1% prn when flared with control.  Patient has no other skin complaints today.  Remainder of the HPI, Meds, PMH, Allergies, FH, and SH was reviewed in chart.      Past Medical History:   Diagnosis Date     HTN (hypertension)        Past Surgical History:   Procedure Laterality Date     HERNIA REPAIR       SHOULDER SURGERY          Family History   Problem Relation Age of Onset     Family History Negative Mother        Social History     Socioeconomic History     Marital status:      Spouse name: Not on file     Number of children: Not on file     Years of education: Not on file     Highest education level: Not on file   Occupational History     Not on file   Tobacco Use     Smoking status: Never Smoker     Smokeless tobacco: Never Used   Substance and Sexual Activity     Alcohol use: Yes     Drug use: No     Sexual activity: Yes     Partners: Female   Other Topics Concern     Parent/sibling w/ CABG, MI or angioplasty before 65F 55M? Not Asked   Social History Narrative     Not on file     Social Determinants of Health     Financial Resource Strain:      Difficulty of Paying Living Expenses:    Food Insecurity:      Worried About Running Out  of Food in the Last Year:      Ran Out of Food in the Last Year:    Transportation Needs:      Lack of Transportation (Medical):      Lack of Transportation (Non-Medical):    Physical Activity:      Days of Exercise per Week:      Minutes of Exercise per Session:    Stress:      Feeling of Stress :    Social Connections:      Frequency of Communication with Friends and Family:      Frequency of Social Gatherings with Friends and Family:      Attends Worship Services:      Active Member of Clubs or Organizations:      Attends Club or Organization Meetings:      Marital Status:    Intimate Partner Violence:      Fear of Current or Ex-Partner:      Emotionally Abused:      Physically Abused:      Sexually Abused:        Outpatient Encounter Medications as of 9/9/2021   Medication Sig Dispense Refill     azelaic acid (FINACIA) 15 % external gel APPLY TO FACE 1-2 TIMES DAILY 50 g 3     doxycycline monohydrate (MONODOX) 50 MG capsule Take 1 capsule (50 mg) by mouth daily 90 capsule 3     lisinopril-hydrochlorothiazide (ZESTORETIC) 20-25 MG tablet Take 1 tablet by mouth daily 90 tablet 1     multivitamin w/minerals (MULTI-VITAMIN) tablet Take 1 tablet by mouth daily       triamcinolone (KENALOG) 0.1 % external cream APPLY TO THE AFFECTED AREAS ON LEGS TWICE A DAY WHEN NEEDED AS DIRECTED 80 g 0     [DISCONTINUED] azelaic acid (FINACIA) 15 % external gel APPLY TO FACE 2 TIMES DAILY 50 g 3     [DISCONTINUED] doxycycline monohydrate (MONODOX) 50 MG capsule TAKE 1 CAPSULE BY MOUTH TWICE DAILY  60 capsule 0     No facility-administered encounter medications on file as of 9/9/2021.       Review Of Systems:  Skin: rosacea, eczema  Eyes: negative  Ears/Nose/Throat: negative  Respiratory: No shortness of breath, dyspnea on exertion, cough, or hemoptysis  Cardiovascular: negative  Gastrointestinal: negative  Genitourinary: negative  Musculoskeletal: negative  Neurologic: negative  Psychiatric:  negative  Hematologic/Lymphatic/Immunologic: negative  Endocrine: negative      Objective:     /78   Eyes: Conjunctivae/lids: Normal   ENT: Lips:  Normal  MSK: Normal  Cardiovascular: Peripheral edema none  Pulm: Breathing Normal  Neuro/Psych: Orientation: A/O x 3 Normal; Mood/Affect: Normal, NAD, WDWN  Pt accompanied by: self  Following areas examined: face, neck, right ventral leg  Grace skin type:i   Findings:  Erythema and telangiectasias of forehead, nose and cheeks.   nml appearing leg  Assessment and Plan:  1) Acne Rosacea   Great control  Azelaic acid 15 % gel apply two times per day-pt will reach out if he needs refills.   Doxycycline 50 mg one cap 1-2 times per day  Treatment options including topical Finacea, Sulfa, Metronidazole, Ivermectin, and oral Doxycyline. Redness of rosacea is more difficult to treat and treatment is considered cosmetic.  Redness can be treated with lasers, Mirvaso or Rofade.    Avoid triggers such as sunlight, spicy foods, and alcohol. Wear sunscreen everyday of the year.     Wear a sunscreen with at least SPF 30 on your face, ears, neck and V of the chest daily. Wear sunscreen on other areas of the body if those areas are exposed to the sun throughout the day. Sunscreens can contain physical and/or chemical blockers. Physical blockers are less likely to clog pores, these include zinc oxide and titanium dioxide. Reapply every two hour and after swimming.     Sunscreen examples: https://www.ewg.org/sunscreen/     2) eczema  Clear today  Triamcinolone 0.1% cream apply two times when needed  Side effects of topical steroids including but not limited to atrophy (skin thinning), striae (stretch marks) telangiectasias, steroid acne, and others. Do not apply to normal skin. Do not apply to discolored skin that does not have rash present.   Proper skin care from [facility]:    -Eliminate harsh soaps as they strip the natural oils from the skin, often resulting in  dry itchy skin ( i.e. Dial, Zest, [place] Spring)  -Use mild soaps such as Cetaphil or Dove Sensitive Skin in the shower. You do not need to use soap on arms, legs, and trunk every time you shower unless visibly soiled.   -Avoid hot or cold showers.  -After showering, lightly dry off and apply moisturizing within 2-3 minutes. This will help trap moisture in the skin.   -Aggressive use of a moisturizer at least 1-2 times a day to the entire body (including -Vanicream, Cetaphil, Aquaphor or Cerave) and moisturize hands after every washing.  -We recommend using moisturizers that come in a tub that needs to be scooped out, not a pump. This has more of an oil base. It will hold moisture in your skin much better than a water base moisturizer. The above recommended are non-pore clogging.           Treatment options including topical Finacea, Sulfa, Metronidazole, Ivermectin, and oral Doxycyline. Redness of rosacea is more difficult to treat and treatment is considered cosmetic.  Redness can be treated with lasers, Mirvaso or Rofade.    Avoid triggers such as sunlight, spicy foods, and alcohol. Wear sunscreen everyday of the year.     Wear a sunscreen with at least SPF 30 on your face, ears, neck and V of the chest daily. Wear sunscreen on other areas of the body if those areas are exposed to the sun throughout the day. Sunscreens can contain physical and/or chemical blockers. Physical blockers are less likely to clog pores, these include zinc oxide and titanium dioxide. Reapply every two hour and after swimming.     Sunscreen examples: https://www.ewg.org/sunscreen/      It was a pleasure speaking with Bacilio Wolf today.   Follow up in yearly         Again, thank you for allowing me to participate in the care of your patient.        Sincerely,        Ela Clemente PA-C

## 2021-09-09 NOTE — PROGRESS NOTES
HPI:  Bacilio Wolf is a 45 year old male patient here today for follow up rosacea on face .  Patient states this has been present for 4 years  Patient reports the following symptoms: breakout and redness .  Patient reports the following previous treatments: doxy 50mg every day-every other day and azalaic acid 1-2x a day with improvement. Pt would like refills. Tolerating medication well. .  Patient reports the following modifying factors: none.  Associated symptoms: none. Also has a history of eczema on legs. Uses TAc 1% prn when flared with control.  Patient has no other skin complaints today.  Remainder of the HPI, Meds, PMH, Allergies, FH, and SH was reviewed in chart.      Past Medical History:   Diagnosis Date     HTN (hypertension)        Past Surgical History:   Procedure Laterality Date     HERNIA REPAIR       SHOULDER SURGERY          Family History   Problem Relation Age of Onset     Family History Negative Mother        Social History     Socioeconomic History     Marital status:      Spouse name: Not on file     Number of children: Not on file     Years of education: Not on file     Highest education level: Not on file   Occupational History     Not on file   Tobacco Use     Smoking status: Never Smoker     Smokeless tobacco: Never Used   Substance and Sexual Activity     Alcohol use: Yes     Drug use: No     Sexual activity: Yes     Partners: Female   Other Topics Concern     Parent/sibling w/ CABG, MI or angioplasty before 65F 55M? Not Asked   Social History Narrative     Not on file     Social Determinants of Health     Financial Resource Strain:      Difficulty of Paying Living Expenses:    Food Insecurity:      Worried About Running Out of Food in the Last Year:      Ran Out of Food in the Last Year:    Transportation Needs:      Lack of Transportation (Medical):      Lack of Transportation (Non-Medical):    Physical Activity:      Days of Exercise per Week:      Minutes of Exercise per  Session:    Stress:      Feeling of Stress :    Social Connections:      Frequency of Communication with Friends and Family:      Frequency of Social Gatherings with Friends and Family:      Attends Mu-ism Services:      Active Member of Clubs or Organizations:      Attends Club or Organization Meetings:      Marital Status:    Intimate Partner Violence:      Fear of Current or Ex-Partner:      Emotionally Abused:      Physically Abused:      Sexually Abused:        Outpatient Encounter Medications as of 9/9/2021   Medication Sig Dispense Refill     azelaic acid (FINACIA) 15 % external gel APPLY TO FACE 1-2 TIMES DAILY 50 g 3     doxycycline monohydrate (MONODOX) 50 MG capsule Take 1 capsule (50 mg) by mouth daily 90 capsule 3     lisinopril-hydrochlorothiazide (ZESTORETIC) 20-25 MG tablet Take 1 tablet by mouth daily 90 tablet 1     multivitamin w/minerals (MULTI-VITAMIN) tablet Take 1 tablet by mouth daily       triamcinolone (KENALOG) 0.1 % external cream APPLY TO THE AFFECTED AREAS ON LEGS TWICE A DAY WHEN NEEDED AS DIRECTED 80 g 0     [DISCONTINUED] azelaic acid (FINACIA) 15 % external gel APPLY TO FACE 2 TIMES DAILY 50 g 3     [DISCONTINUED] doxycycline monohydrate (MONODOX) 50 MG capsule TAKE 1 CAPSULE BY MOUTH TWICE DAILY  60 capsule 0     No facility-administered encounter medications on file as of 9/9/2021.       Review Of Systems:  Skin: rosacea, eczema  Eyes: negative  Ears/Nose/Throat: negative  Respiratory: No shortness of breath, dyspnea on exertion, cough, or hemoptysis  Cardiovascular: negative  Gastrointestinal: negative  Genitourinary: negative  Musculoskeletal: negative  Neurologic: negative  Psychiatric: negative  Hematologic/Lymphatic/Immunologic: negative  Endocrine: negative      Objective:     /78   Eyes: Conjunctivae/lids: Normal   ENT: Lips:  Normal  MSK: Normal  Cardiovascular: Peripheral edema none  Pulm: Breathing Normal  Neuro/Psych: Orientation: A/O x 3 Normal; Mood/Affect:  Normal, NAD, WDWN  Pt accompanied by: self  Following areas examined: face, neck, right ventral leg  Grace skin type:i   Findings:  Erythema and telangiectasias of forehead, nose and cheeks.   nml appearing leg  Assessment and Plan:  1) Acne Rosacea   Great control  Azelaic acid 15 % gel apply two times per day-pt will reach out if he needs refills.   Doxycycline 50 mg one cap 1-2 times per day  Treatment options including topical Finacea, Sulfa, Metronidazole, Ivermectin, and oral Doxycyline. Redness of rosacea is more difficult to treat and treatment is considered cosmetic.  Redness can be treated with lasers, Mirvaso or Rofade.    Avoid triggers such as sunlight, spicy foods, and alcohol. Wear sunscreen everyday of the year.     Wear a sunscreen with at least SPF 30 on your face, ears, neck and V of the chest daily. Wear sunscreen on other areas of the body if those areas are exposed to the sun throughout the day. Sunscreens can contain physical and/or chemical blockers. Physical blockers are less likely to clog pores, these include zinc oxide and titanium dioxide. Reapply every two hour and after swimming.     Sunscreen examples: https://www.ewg.org/sunscreen/     2) eczema  Clear today  Triamcinolone 0.1% cream apply two times when needed  Side effects of topical steroids including but not limited to atrophy (skin thinning), striae (stretch marks) telangiectasias, steroid acne, and others. Do not apply to normal skin. Do not apply to discolored skin that does not have rash present.   Proper skin care from [facility]:    -Eliminate harsh soaps as they strip the natural oils from the skin, often resulting in dry itchy skin ( i.e. Dial, Zest, [place] Spring)  -Use mild soaps such as Cetaphil or Dove Sensitive Skin in the shower. You do not need to use soap on arms, legs, and trunk every time you shower unless visibly soiled.   -Avoid hot or cold showers.  -After showering, lightly dry off and apply  moisturizing within 2-3 minutes. This will help trap moisture in the skin.   -Aggressive use of a moisturizer at least 1-2 times a day to the entire body (including -Vanicream, Cetaphil, Aquaphor or Cerave) and moisturize hands after every washing.  -We recommend using moisturizers that come in a tub that needs to be scooped out, not a pump. This has more of an oil base. It will hold moisture in your skin much better than a water base moisturizer. The above recommended are non-pore clogging.           Treatment options including topical Finacea, Sulfa, Metronidazole, Ivermectin, and oral Doxycyline. Redness of rosacea is more difficult to treat and treatment is considered cosmetic.  Redness can be treated with lasers, Mirvaso or Rofade.    Avoid triggers such as sunlight, spicy foods, and alcohol. Wear sunscreen everyday of the year.     Wear a sunscreen with at least SPF 30 on your face, ears, neck and V of the chest daily. Wear sunscreen on other areas of the body if those areas are exposed to the sun throughout the day. Sunscreens can contain physical and/or chemical blockers. Physical blockers are less likely to clog pores, these include zinc oxide and titanium dioxide. Reapply every two hour and after swimming.     Sunscreen examples: https://www.ewg.org/sunscreen/      It was a pleasure speaking with Bacilio Wolf today.   Follow up in yearly

## 2021-10-10 ENCOUNTER — HEALTH MAINTENANCE LETTER (OUTPATIENT)
Age: 46
End: 2021-10-10

## 2022-01-29 ENCOUNTER — HEALTH MAINTENANCE LETTER (OUTPATIENT)
Age: 47
End: 2022-01-29

## 2022-09-18 ENCOUNTER — HEALTH MAINTENANCE LETTER (OUTPATIENT)
Age: 47
End: 2022-09-18

## 2023-05-07 ENCOUNTER — HEALTH MAINTENANCE LETTER (OUTPATIENT)
Age: 48
End: 2023-05-07

## 2023-05-24 NOTE — PROGRESS NOTES
"Subjective     Bacilio Wolf is a 45 year old male who presents to clinic today for the following health issues:    HPI         Hypertension Follow-up      Do you check your blood pressure regularly outside of the clinic? No     Are you following a low salt diet? Yes    Are your blood pressures ever more than 140 on the top number (systolic) OR more   than 90 on the bottom number (diastolic), for example 140/90? No      How many servings of fruits and vegetables do you eat daily?  2-3    On average, how many sweetened beverages do you drink each day (Examples: soda, juice, sweet tea, etc.  Do NOT count diet or artificially sweetened beverages)?   0    How many days per week do you exercise enough to make your heart beat faster? 3 or less    How many minutes a day do you exercise enough to make your heart beat faster? 9 or less    How many days per week do you miss taking your medication? Sometimes        Soft tissue mass of chest wall:  Bacilio has noticed a soft mobile tender mass at the area of hs xyphoid process over the past several weeks.  No injury to this area. This area is painful to touch.  No other symptoms.           Review of Systems   Constitutional, HEENT, cardiovascular, pulmonary, GI, , musculoskeletal, neuro, skin, endocrine and psych systems are negative, except as otherwise noted.      Objective    /82   Pulse 113   Temp 98.2  F (36.8  C) (Tympanic)   Ht 1.803 m (5' 11\")   Wt 91.6 kg (202 lb)   SpO2 100%   BMI 28.17 kg/m    Body mass index is 28.17 kg/m .  Physical Exam   GENERAL: healthy, alert and no distress  RESP: soft, mildly tender mobile soft tissue mass noted to xyphoid process, lungs clear to auscultation - no rales, rhonchi or wheezes   PSYCH: mentation appears normal, affect normal/bright        Assessment & Plan     Benign essential hypertension  Controlled, continue same regimen  - lisinopril-hydrochlorothiazide (ZESTORETIC) 20-25 MG tablet; Take 1 tablet by mouth " Chief Complaints and History of Present Illnesses   Patient presents with     Post Op (Ophthalmology) Left Eye     Chief Complaint(s) and History of Present Illness(es)     Post Op (Ophthalmology) Left Eye            Laterality: left eye    Associated symptoms: Negative for eye pain          Comments    Patient presents with mom. Patient's mom reports stent fell out and since the fall out, patient has been happy. No pain or discomfort noted by mom.    Vimal MOYA 7:41 AM May 24, 2023                     "daily  - Basic metabolic panel  (Ca, Cl, CO2, Creat, Gluc, K, Na, BUN)    Soft tissue mass  Mobile soft tissue mass noted to xiphoid process, this was not well visualized on x ray today.  US ordered for further evaluation.   - XR Chest 2 Views; Future  - US Chest Wall; Future     BMI:   Estimated body mass index is 28.17 kg/m  as calculated from the following:    Height as of this encounter: 1.803 m (5' 11\").    Weight as of this encounter: 91.6 kg (202 lb).            Return in about 6 months (around 6/3/2021) for BP Recheck.    Ari Danielson PA-C  [facility]      "

## 2023-12-04 ENCOUNTER — TELEPHONE (OUTPATIENT)
Dept: FAMILY MEDICINE | Facility: CLINIC | Age: 48
End: 2023-12-04
Payer: COMMERCIAL

## 2023-12-04 NOTE — TELEPHONE ENCOUNTER
"Pt calling in very frustrated about being on hold for so long. Pt got a notification about appt on Friday. Pt tried to log in and it stated that his \"verification information was wrong\"  reassured pt that he still has his appt on Friday and is able to fill out paperwork when he checks in.     Writer offered pt relations number, pt declined.     Halle Arthur RN    "

## 2023-12-08 ENCOUNTER — OFFICE VISIT (OUTPATIENT)
Dept: FAMILY MEDICINE | Facility: CLINIC | Age: 48
End: 2023-12-08
Payer: COMMERCIAL

## 2023-12-08 VITALS
RESPIRATION RATE: 20 BRPM | WEIGHT: 198.6 LBS | SYSTOLIC BLOOD PRESSURE: 124 MMHG | HEIGHT: 72 IN | OXYGEN SATURATION: 99 % | BODY MASS INDEX: 26.9 KG/M2 | TEMPERATURE: 98.7 F | HEART RATE: 88 BPM | DIASTOLIC BLOOD PRESSURE: 88 MMHG

## 2023-12-08 DIAGNOSIS — S49.91XA SHOULDER INJURY, RIGHT, INITIAL ENCOUNTER: ICD-10-CM

## 2023-12-08 DIAGNOSIS — I10 BENIGN ESSENTIAL HYPERTENSION: Primary | ICD-10-CM

## 2023-12-08 PROCEDURE — 99214 OFFICE O/P EST MOD 30 MIN: CPT | Performed by: FAMILY MEDICINE

## 2023-12-08 RX ORDER — LISINOPRIL AND HYDROCHLOROTHIAZIDE 20; 25 MG/1; MG/1
1 TABLET ORAL DAILY
Qty: 90 TABLET | Refills: 3 | Status: SHIPPED | OUTPATIENT
Start: 2023-12-08

## 2023-12-08 ASSESSMENT — PAIN SCALES - GENERAL: PAINLEVEL: NO PAIN (1)

## 2023-12-08 NOTE — PROGRESS NOTES
Assessment & Plan     Benign essential hypertension  Stable with current dose, will continue monitoring with current prescription   - lisinopril-hydrochlorothiazide (ZESTORETIC) 20-25 MG tablet; Take 1 tablet by mouth daily    Shoulder injury, right, initial encounter  Has been having long term history of shoulder injury and pain, had surgery and intraarticular injection several times, has been flares frequently   Will have him to see [facility] for further evaluation   - Orthopedic  Referral; Future           BMI:   Estimated body mass index is 26.94 kg/m  as calculated from the following:    Height as of this encounter: 1.829 m (6').    Weight as of this encounter: 90.1 kg (198 lb 9.6 oz).       FUTURE APPOINTMENTS:       - Follow-up visit in 6 months for CPE    Dima Luz MD  [facility] is a 48 year old, presenting for the following health issues:  Shoulder Pain (Right shoulder/)      12/8/2023     8:54 AM   Additional Questions   Roomed by Serene CASTELLANOS       Shoulder Pain    History of Present Illness       Reason for visit:  Shoulder  Symptom onset:  More than a month  Symptoms include:  Pain  Symptom intensity:  Moderate  Symptom progression:  Worsening  Had these symptoms before:  Yes    He eats 2-3 servings of fruits and vegetables daily.He consumes 0 sweetened beverage(s) daily.He exercises with enough effort to increase his heart rate 9 or less minutes per day.  He exercises with enough effort to increase his heart rate 3 or less days per week. He is missing 2 dose(s) of medications per week.  He is not taking prescribed medications regularly due to remembering to take.         Review of Systems   Constitutional, HEENT, cardiovascular, pulmonary, gi and gu systems are negative, except as otherwise noted.      Objective    /88 (BP Location: Left arm, Patient Position: Sitting, Cuff Size: Adult Regular)   Pulse 88   Temp 98.7  F (37.1  C) (Tympanic)    Resp 20   Ht 1.829 m (6')   Wt 90.1 kg (198 lb 9.6 oz)   SpO2 99%   BMI 26.94 kg/m    Body mass index is 26.94 kg/m .  Physical Exam   GENERAL: healthy, alert and no distress  NECK: no adenopathy, no asymmetry, masses, or scars and thyroid normal to palpation  RESP: lungs clear to auscultation - no rales, rhonchi or wheezes  CV: regular rate and rhythm, normal S1 S2, no S3 or S4, no murmur, click or rub, no peripheral edema and peripheral pulses strong  ABDOMEN: soft, nontender, no hepatosplenomegaly, no masses and bowel sounds normal  MS: no gross musculoskeletal defects noted, no edema

## 2024-01-08 ENCOUNTER — NURSE TRIAGE (OUTPATIENT)
Dept: FAMILY MEDICINE | Facility: CLINIC | Age: 49
End: 2024-01-08

## 2024-01-08 ENCOUNTER — ALLIED HEALTH/NURSE VISIT (OUTPATIENT)
Dept: FAMILY MEDICINE | Facility: CLINIC | Age: 49
End: 2024-01-08
Payer: COMMERCIAL

## 2024-01-08 VITALS
DIASTOLIC BLOOD PRESSURE: 75 MMHG | HEART RATE: 112 BPM | OXYGEN SATURATION: 96 % | TEMPERATURE: 97.3 F | SYSTOLIC BLOOD PRESSURE: 116 MMHG

## 2024-01-08 DIAGNOSIS — S61.209A AVULSION OF SKIN OF FINGER, INITIAL ENCOUNTER: Primary | ICD-10-CM

## 2024-01-08 PROCEDURE — 99213 OFFICE O/P EST LOW 20 MIN: CPT

## 2024-01-08 ASSESSMENT — PAIN SCALES - GENERAL: PAINLEVEL: NO PAIN (1)

## 2024-01-08 NOTE — TELEPHONE ENCOUNTER
"Pt walked into clinic after cutting L ring finger at home while slicing carrots with a kitchen knife. Pt report 1/10 pain, still has fulling feeling and ROM in finger.   Vitals:/80, , O2 96%, T 97.3F  Vitals: /75, , O2 96%  Pt reports no dizziness, no other symptoms.    Huddled with provider who recommended irrigation of finger, and dressing applied.    Pt tolerated irrigation a dressing. Pt educated on signs of infection and when to call the clinic back. Pt stated understanding.    Reason for Disposition   Bleeding won't stop after 10 minutes of direct pressure (using correct technique)    Additional Information   Negative: Major bleeding (actively dripping or spurting) that can't be stopped   Negative: Sounds like a life-threatening emergency to the triager   Negative: Wound looks infected   Negative: Caused by animal bite   Negative: Amputation   Negative: High-pressure injection injury (e.g., from paint gun, usually work-related)   Negative: Looks like a broken bone or dislocated joint (e.g., crooked or deformed)   Negative: Skin is split open or gaping (length > 1/2 inch or 12 mm)   Negative: Cut or scrape is very deep (e.g., can see bone or tendons)    Answer Assessment - Initial Assessment Questions  1. MECHANISM: \"How did the injury happen?\"       Cutting up carrots this morning- cut finger    2. ONSET: \"When did the injury happen?\" (Minutes or hours ago)       9:30AM    3. LOCATION: \"What part of the finger is injured?\" \"Is the nail damaged?\"       L ring finger     4. APPEARANCE of the INJURY: \"What does the injury look like?\"       Bleeding     5. SEVERITY: \"Can you use the hand normally?\"  \"Can you bend your fingers into a ball and then fully open them?\"      Yes    6. SIZE: For cuts, bruises, or swelling, ask: \"How large is it?\" (e.g., inches or centimeters;  entire finger)       No bruising, 1/2 inch     7. PAIN: \"Is there pain?\" If Yes, ask: \"How bad is the pain?\"    (e.g., " "Scale 1-10; or mild, moderate, severe)   - NONE (0): no pain.   - MILD (1-3): doesn't interfere with normal activities.    - MODERATE (4-7): interferes with normal activities or awakens from sleep.   - SEVERE (8-10): excruciating pain, unable to hold a glass of water or bend finger even a little.      1/10    8. TETANUS: For any breaks in the skin, ask: \"When was the last tetanus booster?\"      1/18/27    9. OTHER SYMPTOMS: \"Do you have any other symptoms?\"      No    10. PREGNANCY: \"Is there any chance you are pregnant?\" \"When was your last menstrual period?\"        N/A    Protocols used: Finger Injury-A-[place]    Halle Arhtur RN    "

## 2024-01-08 NOTE — PROGRESS NOTES
1. Avulsion of skin of finger, initial encounter  Avulsion was irrigated by MA staff.  I them reassessed the wound and dressed this with surgicel and guaze. Bleeding controlled.  No sutures indicated.  Home care instructions discussed. No complications.       Kal Ribera is a 48 year old, presenting for the following health issues:  Laceration      HPI   Triage note:    Pt walked into clinic after cutting L ring finger at home while slicing carrots with a kitchen knife. Pt report 1/10 pain, still has fulling feeling and ROM in finger. Patient could not get bleeding to stop.      Vitals:/80, , O2 96%, T 97.3F  Vitals: /75, , O2 96%  Pt reports no dizziness, no other symptoms.         Review of Systems   Constitutional, HEENT, cardiovascular, pulmonary, gi and gu systems are negative, except as otherwise noted.      Objective    /75 (BP Location: Left arm, Patient Position: Sitting, Cuff Size: Adult Large)   Pulse 112   Temp 97.3  F (36.3  C)   SpO2 96%   There is no height or weight on file to calculate BMI.  Physical Exam   GENERAL: healthy, alert and no distress  SKIN: 1 cm irregular appearing avulsion to the epidermis noted to L ring finger, bleeding is present.  Dermis visible, no bone visible.  Patient has FROM and strength of left digits

## 2024-07-14 ENCOUNTER — HEALTH MAINTENANCE LETTER (OUTPATIENT)
Age: 49
End: 2024-07-14

## 2025-03-13 ENCOUNTER — OFFICE VISIT (OUTPATIENT)
Dept: FAMILY MEDICINE | Facility: CLINIC | Age: 50
End: 2025-03-13
Payer: COMMERCIAL

## 2025-03-13 VITALS
OXYGEN SATURATION: 98 % | BODY MASS INDEX: 29.68 KG/M2 | HEIGHT: 71 IN | WEIGHT: 212 LBS | TEMPERATURE: 97.5 F | DIASTOLIC BLOOD PRESSURE: 104 MMHG | SYSTOLIC BLOOD PRESSURE: 160 MMHG | RESPIRATION RATE: 18 BRPM | HEART RATE: 90 BPM

## 2025-03-13 DIAGNOSIS — I10 BENIGN ESSENTIAL HYPERTENSION: Primary | ICD-10-CM

## 2025-03-13 DIAGNOSIS — K58.9 IRRITABLE BOWEL SYNDROME, UNSPECIFIED TYPE: ICD-10-CM

## 2025-03-13 DIAGNOSIS — Z12.11 SCREEN FOR COLON CANCER: ICD-10-CM

## 2025-03-13 RX ORDER — LISINOPRIL AND HYDROCHLOROTHIAZIDE 20; 25 MG/1; MG/1
1 TABLET ORAL DAILY
Qty: 90 TABLET | Refills: 3 | Status: SHIPPED | OUTPATIENT
Start: 2025-03-13

## 2025-03-13 ASSESSMENT — PAIN SCALES - GENERAL: PAINLEVEL_OUTOF10: NO PAIN (0)

## 2025-03-13 NOTE — PROGRESS NOTES
"  Assessment & Plan     Benign essential hypertension  Has been elevated after not taking, has no clinical sx, will have him to resume Zestoretic with close monitoring   Lab results will reviewed and updated to pt   - Lipid panel reflex to direct LDL Non-fasting; Future  - Comprehensive metabolic panel (BMP + Alb, Alk Phos, ALT, AST, Total. Bili, TP); Future  - lisinopril-hydrochlorothiazide (ZESTORETIC) 20-25 MG tablet; Take 1 tablet by mouth daily.    Screen for colon cancer    - Colonoscopy Screening  Referral; Future    Irritable bowel syndrome, unspecified type  Has been worsening with stress, encouraged him to balance diet with fiber and stress management           BMI  Estimated body mass index is 29.57 kg/m  as calculated from the following:    Height as of this encounter: 1.803 m (5' 11\").    Weight as of this encounter: 96.2 kg (212 lb).   Weight management plan: Discussed healthy diet and exercise guidelines      FUTURE APPOINTMENTS:       - Follow-up visit in 1 year for CPE    Kal   Bacilio is a 49 year old, presenting for the following health issues:  Hypertension        3/13/2025    12:51 PM   Additional Questions   Roomed by Braeden     History of Present Illness       Hypertension: He presents for follow up of hypertension.  He does check blood pressure  regularly outside of the clinic. Outside blood pressures have been over 140/90. He does not follow a low salt diet.     He eats 2-3 servings of fruits and vegetables daily.He consumes 0 sweetened beverage(s) daily.He exercises with enough effort to increase his heart rate 9 or less minutes per day.  He exercises with enough effort to increase his heart rate 3 or less days per week. He is missing 1 dose(s) of medications per week.  He is not taking prescribed medications regularly due to remembering to take.          Hypertension Follow-up    Do you check your blood pressure regularly outside of the clinic? No   Are you following a low " "salt diet? Yes  Are your blood pressures ever more than 140 on the top number (systolic) OR more   than 90 on the bottom number (diastolic), for example 140/90? Yes        Review of Systems  Constitutional, HEENT, cardiovascular, pulmonary, GI, , musculoskeletal, neuro, skin, endocrine and psych systems are negative, except as otherwise noted.      Objective    BP (!) 160/104   Pulse 90   Temp 97.5  F (36.4  C) (Temporal)   Resp 18   Ht 1.803 m (5' 11\")   Wt 96.2 kg (212 lb)   SpO2 98%   BMI 29.57 kg/m    Body mass index is 29.57 kg/m .  Physical Exam   GENERAL: alert and no distress  EYES: Eyes grossly normal to inspection, PERRL and conjunctivae and sclerae normal  HENT: ear canals and TM's normal, nose and mouth without ulcers or lesions  NECK: no adenopathy, no asymmetry, masses, or scars  RESP: lungs clear to auscultation - no rales, rhonchi or wheezes  CV: regular rate and rhythm, normal S1 S2, no S3 or S4, no murmur, click or rub, no peripheral edema  ABDOMEN: soft, nontender, no hepatosplenomegaly, no masses and bowel sounds normal  MS: no gross musculoskeletal defects noted, no edema  SKIN: no suspicious lesions or rashes  NEURO: Normal strength and tone, mentation intact and speech normal  BACK: no CVA tenderness, no paralumbar tenderness  PSYCH: mentation appears normal, affect normal/bright            Signed Electronically by: Dima Luz MD    "

## 2025-03-18 ENCOUNTER — TELEPHONE (OUTPATIENT)
Dept: GASTROENTEROLOGY | Facility: CLINIC | Age: 50
End: 2025-03-18
Payer: COMMERCIAL

## 2025-03-18 NOTE — TELEPHONE ENCOUNTER
"Endoscopy Scheduling Screen    Have you had any respiratory illness or flu-like symptoms in the last 10 days?  No    What is your communication preference for Instructions and/or Bowel Prep?   MyChart    What insurance is in the chart?  Other:      Ordering/Referring Provider:   SONIA PARRA        (If ordering provider performs procedure, schedule with ordering provider unless otherwise instructed. )    BMI: Estimated body mass index is 29.57 kg/m  as calculated from the following:    Height as of 3/13/25: 1.803 m (5' 11\").    Weight as of 3/13/25: 96.2 kg (212 lb).     Sedation Ordered  moderate sedation.   If patient BMI > 50 do not schedule in [facility].    If patient BMI > 45 do not schedule at [facility].    Are you taking methadone or Suboxone?  NO, No RN review required.    Have you been diagnosed and are being treated for severe PTSD or severe anxiety?  NO, No RN review required.    Are you taking any prescription medications for pain 3 or more times per week?   NO, No RN review required.    Do you have a history of malignant hyperthermia?  No    (Females) Are you currently pregnant?   No     Have you been diagnosed or told you have pulmonary hypertension?   No    Do you have an LVAD?  No    Have you been told you have moderate to severe sleep apnea?  Yes. Do you use a CPAP? No. (RN Review required for scheduling unless scheduling in Hospital.)     Have you been told you have COPD, asthma, or any other lung disease?  No    Has your doctor ordered any cardiac tests like echo, angiogram, stress test, ablation, or EKG, that you have not completed yet?  No    Do you  have a history of any heart conditions?  No     Have you ever had or are you waiting for an organ transplant?  No. Continue scheduling, no site restrictions.    Have you had a stroke or transient ischemic attack (TIA aka \"mini stroke\") in the last 2 years?   No.    Have you been diagnosed with or been told you have cirrhosis of the liver?   No.    Are you " "currently on dialysis?   No    Do you need assistance transferring?   No    BMI: Estimated body mass index is 29.57 kg/m  as calculated from the following:    Height as of 3/13/25: 1.803 m (5' 11\").    Weight as of 3/13/25: 96.2 kg (212 lb).     Is patients BMI > 40 and scheduling location [facility]?  No    Do you take an injectable or oral medication for weight loss or diabetes (excluding insulin)?  No    Do you take the medication Naltrexone?  No    Do you take blood thinners?  No       Prep   Are you currently on dialysis or do you have chronic kidney disease?  No    Do you have a diagnosis of diabetes?  No    Do you have a diagnosis of cystic fibrosis (CF)?  No    On a regular basis do you go 3 -5 days between bowel movements?  No    BMI > 40?  No    Preferred Pharmacy:    [id number] - [place], [place] - [address]  [id number]  [place] [address]  Phone: [phone] Fax: [phone]    Final Scheduling Details     Procedure scheduled  Colonoscopy    Surgeon:  AL    Date of procedure:  04/11/2025      Pre-OP / PAC:   No - Not required for this site.    Location  [facility] - Per order.    Sedation   Moderate Sedation - Per order.      Patient Reminders:   You will receive a call from a Nurse to review instructions and health history.  This assessment must be completed prior to your procedure.  Failure to complete the Nurse assessment may result in the procedure being cancelled.      On the day of your procedure, please designate an adult(s) who can drive you home stay with you for the next 24 hours. The medicines used in the exam will make you sleepy. You will not be able to drive.      You cannot take public transportation, ride share services, or non-medical taxi service without a responsible caregiver.  Medical transport services are allowed with the requirement that a responsible caregiver will receive you at your destination.  We require that drivers and caregivers are " confirmed prior to your procedure.

## 2025-03-26 ENCOUNTER — TELEPHONE (OUTPATIENT)
Dept: GASTROENTEROLOGY | Facility: CLINIC | Age: 50
End: 2025-03-26
Payer: COMMERCIAL

## 2025-03-26 NOTE — TELEPHONE ENCOUNTER
Pre assessment completed for upcoming procedure.   (Please see previous telephone encounter notes for complete details)    Procedure details:    Arrival time and facility location reviewed.    Pre op exam needed? No.    Designated  policy reviewed. Instructed to have someone stay 6  hours post procedure.       Medication review:    Medications reviewed. Please see supporting documentation below. Holding recommendations discussed (if applicable).       Prep for procedure:     Procedure prep instructions reviewed.        Any additional information needed:  N/A      Patient verbalized understanding and had no questions or concerns at this time.      Suzette Stephens RN  Endoscopy Procedure Pre Assessment   [phone] option 3

## 2025-03-26 NOTE — TELEPHONE ENCOUNTER
Pre visit planning completed.      Procedure details:    Patient scheduled for Colonoscopy on 4/11/25.     Arrival time: 1000. Procedure time 1045    Facility location: [facility]; [facility] [place] [address]. Check in location: 1st Floor [facility], [place].     Sedation type: Conscious sedation - discuss ETOH use, noted in outside problem list. Unclear how much and if current.    Pre op exam needed? No.    Indication for procedure: screening      Chart review:     Electronic implanted devices? No    Recent diagnosis of diverticulitis within the last 6 weeks? No      Medication review:    Diabetic? No    Anticoagulants? No    Weight loss medication/injectable? No GLP-1 medication per patient's medication list. Nursing to verify with pre-assessment call.    Other medication HOLDING recommendations:  N/A      Prep for procedure:     Bowel prep recommendation: Standard Miralax.   Due to: standard bowel prep    Procedure information and instructions sent via [employer]         Suzette Stephens RN  Endoscopy Procedure Pre Assessment   [phone] option 3

## 2025-04-11 ENCOUNTER — HOSPITAL ENCOUNTER (OUTPATIENT)
Facility: CLINIC | Age: 50
Discharge: HOME | End: 2025-04-11
Attending: COLON & RECTAL SURGERY | Admitting: COLON & RECTAL SURGERY
Payer: COMMERCIAL

## 2025-04-11 VITALS
DIASTOLIC BLOOD PRESSURE: 87 MMHG | SYSTOLIC BLOOD PRESSURE: 138 MMHG | BODY MASS INDEX: 28.7 KG/M2 | RESPIRATION RATE: 8 BRPM | WEIGHT: 205 LBS | HEIGHT: 71 IN | HEART RATE: 102 BPM | OXYGEN SATURATION: 97 %

## 2025-04-11 LAB — COLONOSCOPY: NORMAL

## 2025-04-11 PROCEDURE — G0121 COLON CA SCRN NOT HI RSK IND: HCPCS | Performed by: COLON & RECTAL SURGERY

## 2025-04-11 PROCEDURE — G0500 MOD SEDAT ENDO SERVICE >5YRS: HCPCS | Performed by: COLON & RECTAL SURGERY

## 2025-04-11 PROCEDURE — 45378 DIAGNOSTIC COLONOSCOPY: CPT | Performed by: COLON & RECTAL SURGERY

## 2025-04-11 PROCEDURE — 250N000011 HC RX IP 250 OP 636: Mod: JZ | Performed by: COLON & RECTAL SURGERY

## 2025-04-11 RX ORDER — FENTANYL CITRATE 50 UG/ML
INJECTION, SOLUTION INTRAMUSCULAR; INTRAVENOUS PRN
Status: DISCONTINUED | OUTPATIENT
Start: 2025-04-11 | End: 2025-04-11 | Stop reason: HOSPADM

## 2025-04-11 ASSESSMENT — ACTIVITIES OF DAILY LIVING (ADL)
ADLS_ACUITY_SCORE: 41
ADLS_ACUITY_SCORE: 41

## 2025-04-11 NOTE — H&P
"Pre-Endoscopy History and Physical     Bacilio Wolf MRN# [id number]   YOB: 1975 Age: 49 year old     Date of Procedure: 4/11/2025  Primary care provider: Air Danielson  Type of Endoscopy: Colonoscopy  Reason for Procedure: screening  Type of Anesthesia Anticipated: Moderate Sedation    HPI:    Bacilio is a 49 year old male who will be undergoing the above procedure.      A history and physical has been performed. The patient's medications and allergies have been reviewed. The risks and benefits of the procedure and the sedation options and risks were discussed with the patient.  All questions were answered and informed consent was obtained.      He denies a personal or family history of anesthesia complications or bleeding disorders.     Allergies   Allergen Reactions    Diphenhydramine Rash     Rash , confusion        No current facility-administered medications for this encounter.       Patient Active Problem List   Diagnosis    Overweight (BMI 25.0-29.9)    Chronic right shoulder pain    Benign essential hypertension        Past Medical History:   Diagnosis Date    HTN (hypertension)         Past Surgical History:   Procedure Laterality Date    ENT SURGERY      wisdom teeth    HERNIA REPAIR      ORTHOPEDIC SURGERY  8/1/2004    Shoulder Surgery    SHOULDER SURGERY      SOFT TISSUE SURGERY  8/1/2004    Shoulder Surgery       Social History     Tobacco Use    Smoking status: Never    Smokeless tobacco: Never   Substance Use Topics    Alcohol use: Yes     Comment: regular       Family History   Problem Relation Age of Onset    Family History Negative Mother     Diabetes Mother        REVIEW OF SYSTEMS:     5 point ROS negative except as noted above in HPI, including Gen., Resp., CV, GI &  system review.      PHYSICAL EXAM:   BP (!) 140/89   Pulse 100   Resp 16   Ht 1.803 m (5' 11\")   Wt 93 kg (205 lb)   SpO2 98%   BMI 28.59 kg/m   Estimated body mass index is 28.59 kg/m  as " "calculated from the following:    Height as of this encounter: 1.803 m (5' 11\").    Weight as of this encounter: 93 kg (205 lb).   GENERAL APPEARANCE: healthy and alert  MENTAL STATUS: alert  AIRWAY EXAM: Mallampatti Class I (visualization of the soft palate, fauces, uvula, anterior and posterior pillars)  RESP: lungs clear to auscultation - no rales, rhonchi or wheezes  CV: regular rates and rhythm      IMPRESSION   ASA Class 1 - Healthy patient, no medical problems        PLAN:     Plan for colonoscopy. We discussed the risks, benefits and alternatives and the patient wished to proceed.    The above has been forwarded to the consulting provider.      Vincenzo Burgess MD  [facility]  Phone: [phone]  Fax: [phone]  April 11, 2025    "

## 2025-06-05 ENCOUNTER — ALLIED HEALTH/NURSE VISIT (OUTPATIENT)
Dept: FAMILY MEDICINE | Facility: CLINIC | Age: 50
End: 2025-06-05
Payer: COMMERCIAL

## 2025-06-05 VITALS — TEMPERATURE: 98 F

## 2025-06-05 DIAGNOSIS — Z23 ENCOUNTER FOR IMMUNIZATION: Primary | ICD-10-CM

## 2025-06-05 NOTE — PROGRESS NOTES
Prior to immunization administration, verified patients identity using patient s name and date of birth. Please see Immunization Activity for additional information.     Is the patient's temperature normal (100.5 or less)? Yes     Patient MEETS CRITERIA. PROCEED with vaccine administration.          6/5/2025   Zoster   Have you had a serious reaction to the shingles vaccine or something in the shingles vaccine? No   Do you have shingles now? No   Are you getting treatment for cancer, organ transplant, or bone marrow transplant? No   Do you have an autoimmune or inflammatory condition? No   Is the patient immunocompromised and wanting to complete the Shingles vaccine series in less than 2 months? No   Have you had Guillain-[place] syndrome within 6 weeks of getting a vaccine? No         Patient MEETS CRITERIA. PROCEED with vaccine administration.      Patient instructed to remain in clinic for 15 minutes afterwards, and to report any adverse reactions.      Link to Ancillary Visit Immunization Standing Orders SmartSet     Screening performed by Shaji Aquino MA on 6/5/2025 at 10:02 AM.        Prior to immunization administration, verified patients identity using patient s name and date of birth. Please see Immunization Activity for additional information.     Is the patient's temperature normal (100.5 or less)? Yes     Patient MEETS CRITERIA. PROCEED with vaccine administration.          6/5/2025   Zoster   Have you had a serious reaction to the shingles vaccine or something in the shingles vaccine? No   Do you have shingles now? No   Are you getting treatment for cancer, organ transplant, or bone marrow transplant? No   Do you have an autoimmune or inflammatory condition? No   Is the patient immunocompromised and wanting to complete the Shingles vaccine series in less than 2 months? No   Have you had Guillain-[place] syndrome within 6 weeks of getting a vaccine? No         Patient MEETS CRITERIA. PROCEED with vaccine  administration.      Patient instructed to remain in clinic for 15 minutes afterwards, and to report any adverse reactions.      Link to Ancillary Visit Immunization Standing Orders SmartSet     Screening performed by Shaji Aquino MA on 6/5/2025 at 10:10 AM.

## 2025-07-19 ENCOUNTER — HEALTH MAINTENANCE LETTER (OUTPATIENT)
Age: 50
End: 2025-07-19

## (undated) RX ORDER — FENTANYL CITRATE 50 UG/ML
INJECTION, SOLUTION INTRAMUSCULAR; INTRAVENOUS
Status: DISPENSED
Start: 2025-04-11